# Patient Record
Sex: FEMALE | Race: WHITE | Employment: OTHER | URBAN - METROPOLITAN AREA
[De-identification: names, ages, dates, MRNs, and addresses within clinical notes are randomized per-mention and may not be internally consistent; named-entity substitution may affect disease eponyms.]

---

## 2024-06-10 ENCOUNTER — HOSPITAL ENCOUNTER (INPATIENT)
Age: 69
LOS: 3 days | Discharge: NURSING, REHAB OR LONG-TERM CARE | End: 2024-06-14
Attending: EMERGENCY MEDICINE | Admitting: STUDENT IN AN ORGANIZED HEALTH CARE EDUCATION/TRAINING PROGRAM
Payer: COMMERCIAL

## 2024-06-10 DIAGNOSIS — R79.89 ELEVATED TROPONIN: ICD-10-CM

## 2024-06-10 DIAGNOSIS — J96.01 ACUTE RESPIRATORY FAILURE WITH HYPOXIA (HCC): Primary | ICD-10-CM

## 2024-06-10 DIAGNOSIS — N39.0 SEPSIS SECONDARY TO UTI (HCC): ICD-10-CM

## 2024-06-10 DIAGNOSIS — G89.4 CHRONIC PAIN SYNDROME: ICD-10-CM

## 2024-06-10 DIAGNOSIS — A41.9 SEPSIS SECONDARY TO UTI (HCC): ICD-10-CM

## 2024-06-10 PROCEDURE — 82803 BLOOD GASES ANY COMBINATION: CPT

## 2024-06-10 PROCEDURE — 85025 COMPLETE CBC W/AUTO DIFF WBC: CPT

## 2024-06-10 PROCEDURE — 2700000000 HC OXYGEN THERAPY PER DAY

## 2024-06-10 PROCEDURE — 83880 ASSAY OF NATRIURETIC PEPTIDE: CPT

## 2024-06-10 PROCEDURE — 80053 COMPREHEN METABOLIC PANEL: CPT

## 2024-06-10 PROCEDURE — 99285 EMERGENCY DEPT VISIT HI MDM: CPT

## 2024-06-10 PROCEDURE — 87636 SARSCOV2 & INF A&B AMP PRB: CPT

## 2024-06-10 PROCEDURE — 84484 ASSAY OF TROPONIN QUANT: CPT

## 2024-06-10 PROCEDURE — 83690 ASSAY OF LIPASE: CPT

## 2024-06-10 PROCEDURE — 94761 N-INVAS EAR/PLS OXIMETRY MLT: CPT

## 2024-06-10 PROCEDURE — 83605 ASSAY OF LACTIC ACID: CPT

## 2024-06-10 RX ORDER — FLUDROCORTISONE ACETATE 0.1 MG/1
100 TABLET ORAL NIGHTLY
COMMUNITY
Start: 2021-08-25

## 2024-06-10 RX ORDER — CIPROFLOXACIN 500 MG/1
500 TABLET, FILM COATED ORAL 2 TIMES DAILY
Status: ON HOLD | COMMUNITY
End: 2024-06-14 | Stop reason: HOSPADM

## 2024-06-10 RX ORDER — NYSTATIN 100000 U/G
OINTMENT TOPICAL 2 TIMES DAILY
Status: ON HOLD | COMMUNITY
Start: 2024-02-12 | End: 2024-06-14 | Stop reason: HOSPADM

## 2024-06-10 RX ORDER — FUROSEMIDE 20 MG/1
20 TABLET ORAL DAILY PRN
COMMUNITY
Start: 2024-02-12

## 2024-06-10 RX ORDER — LEVETIRACETAM 250 MG/1
15 TABLET, FILM COATED ORAL EVERY 6 HOURS PRN
Status: ON HOLD | COMMUNITY
Start: 2024-02-12 | End: 2024-06-14 | Stop reason: HOSPADM

## 2024-06-10 RX ORDER — NIFEDIPINE 30 MG
30 TABLET, EXTENDED RELEASE ORAL DAILY
COMMUNITY
Start: 2024-02-13

## 2024-06-10 RX ORDER — QUETIAPINE FUMARATE 100 MG/1
100 TABLET, FILM COATED ORAL DAILY
COMMUNITY
Start: 2022-06-08

## 2024-06-10 RX ORDER — KETOCONAZOLE 20 MG/G
CREAM TOPICAL
Status: ON HOLD | COMMUNITY
Start: 2024-03-20 | End: 2024-06-14 | Stop reason: HOSPADM

## 2024-06-10 RX ORDER — LORATADINE 10 MG/1
10 TABLET ORAL DAILY
COMMUNITY
Start: 2021-08-25

## 2024-06-10 RX ORDER — BUSPIRONE HYDROCHLORIDE 5 MG/1
TABLET ORAL
Status: ON HOLD | COMMUNITY
Start: 2024-03-04 | End: 2024-06-14 | Stop reason: HOSPADM

## 2024-06-10 RX ORDER — MECLIZINE HCL 25MG 25 MG/1
12.5 TABLET, CHEWABLE ORAL DAILY PRN
Status: ON HOLD | COMMUNITY
End: 2024-06-14 | Stop reason: HOSPADM

## 2024-06-10 RX ORDER — DIVALPROEX SODIUM 500 MG/1
500 TABLET, EXTENDED RELEASE ORAL 2 TIMES DAILY
COMMUNITY
Start: 2024-05-01

## 2024-06-10 RX ORDER — CALCITRIOL 0.25 UG/1
0.25 CAPSULE, LIQUID FILLED ORAL
Status: ON HOLD | COMMUNITY
Start: 2024-01-08 | End: 2024-06-14 | Stop reason: HOSPADM

## 2024-06-10 RX ORDER — CLONAZEPAM 0.5 MG/1
0.5 TABLET ORAL 3 TIMES DAILY PRN
COMMUNITY
Start: 2024-06-05

## 2024-06-10 RX ORDER — FOLIC ACID 1 MG/1
1 TABLET ORAL DAILY
COMMUNITY
Start: 2023-08-30

## 2024-06-10 RX ORDER — MECLIZINE HCL 12.5 MG/1
12.5 TABLET ORAL 3 TIMES DAILY PRN
Status: ON HOLD | COMMUNITY
Start: 2021-08-25 | End: 2024-06-14 | Stop reason: HOSPADM

## 2024-06-10 RX ORDER — FAMOTIDINE 20 MG/1
20 TABLET, FILM COATED ORAL 2 TIMES DAILY PRN
COMMUNITY
Start: 2021-08-25

## 2024-06-10 RX ORDER — DIVALPROEX SODIUM 500 MG/1
500 TABLET, DELAYED RELEASE ORAL EVERY 12 HOURS
Status: ON HOLD | COMMUNITY
Start: 2024-02-12 | End: 2024-06-14 | Stop reason: HOSPADM

## 2024-06-10 RX ORDER — QUETIAPINE FUMARATE 200 MG/1
200 TABLET, FILM COATED ORAL NIGHTLY
COMMUNITY
Start: 2024-02-12

## 2024-06-10 ASSESSMENT — PAIN - FUNCTIONAL ASSESSMENT: PAIN_FUNCTIONAL_ASSESSMENT: 0-10

## 2024-06-10 ASSESSMENT — PAIN SCALES - GENERAL: PAINLEVEL_OUTOF10: 8

## 2024-06-10 ASSESSMENT — LIFESTYLE VARIABLES
HOW OFTEN DO YOU HAVE A DRINK CONTAINING ALCOHOL: NEVER
HOW MANY STANDARD DRINKS CONTAINING ALCOHOL DO YOU HAVE ON A TYPICAL DAY: PATIENT DOES NOT DRINK

## 2024-06-11 ENCOUNTER — APPOINTMENT (OUTPATIENT)
Dept: CT IMAGING | Age: 69
End: 2024-06-11
Payer: COMMERCIAL

## 2024-06-11 ENCOUNTER — APPOINTMENT (OUTPATIENT)
Dept: GENERAL RADIOLOGY | Age: 69
End: 2024-06-11
Payer: COMMERCIAL

## 2024-06-11 PROBLEM — D64.9 ANEMIA: Status: ACTIVE | Noted: 2024-06-11

## 2024-06-11 PROBLEM — A41.9 SEPSIS (HCC): Status: ACTIVE | Noted: 2024-06-11

## 2024-06-11 PROBLEM — N17.9 ACUTE RENAL FAILURE SUPERIMPOSED ON STAGE 3 CHRONIC KIDNEY DISEASE (HCC): Status: ACTIVE | Noted: 2024-06-11

## 2024-06-11 PROBLEM — E66.01 MORBID OBESITY (HCC): Status: ACTIVE | Noted: 2024-06-11

## 2024-06-11 PROBLEM — R09.02 HYPOXIA: Status: ACTIVE | Noted: 2024-06-11

## 2024-06-11 PROBLEM — N39.0 UTI (URINARY TRACT INFECTION): Status: ACTIVE | Noted: 2024-06-11

## 2024-06-11 PROBLEM — I95.1 ORTHOSTATIC HYPOTENSION: Status: ACTIVE | Noted: 2024-06-11

## 2024-06-11 PROBLEM — N18.30 ACUTE RENAL FAILURE SUPERIMPOSED ON STAGE 3 CHRONIC KIDNEY DISEASE (HCC): Status: ACTIVE | Noted: 2024-06-11

## 2024-06-11 LAB
ALBUMIN SERPL-MCNC: 3.8 G/DL (ref 3.4–5)
ALBUMIN/GLOB SERPL: 1 {RATIO} (ref 1.1–2.2)
ALP SERPL-CCNC: 74 U/L (ref 40–129)
ALT SERPL-CCNC: 12 U/L (ref 10–40)
ANION GAP SERPL CALCULATED.3IONS-SCNC: 13 MMOL/L (ref 3–16)
ANION GAP SERPL CALCULATED.3IONS-SCNC: 9 MMOL/L (ref 3–16)
AST SERPL-CCNC: 15 U/L (ref 15–37)
BACTERIA URNS QL MICRO: ABNORMAL /HPF
BASE EXCESS BLDV CALC-SCNC: 0 MMOL/L (ref -3–3)
BASOPHILS # BLD: 0 K/UL (ref 0–0.2)
BASOPHILS # BLD: 0.1 K/UL (ref 0–0.2)
BASOPHILS NFR BLD: 0.2 %
BASOPHILS NFR BLD: 0.6 %
BILIRUB SERPL-MCNC: 0.3 MG/DL (ref 0–1)
BILIRUB UR QL STRIP.AUTO: NEGATIVE
BUN SERPL-MCNC: 26 MG/DL (ref 7–20)
BUN SERPL-MCNC: 26 MG/DL (ref 7–20)
CALCIUM SERPL-MCNC: 8.2 MG/DL (ref 8.3–10.6)
CALCIUM SERPL-MCNC: 8.8 MG/DL (ref 8.3–10.6)
CHLORIDE SERPL-SCNC: 94 MMOL/L (ref 99–110)
CHLORIDE SERPL-SCNC: 99 MMOL/L (ref 99–110)
CLARITY UR: ABNORMAL
CO2 BLDV-SCNC: 26 MMOL/L
CO2 SERPL-SCNC: 26 MMOL/L (ref 21–32)
CO2 SERPL-SCNC: 27 MMOL/L (ref 21–32)
COHGB MFR BLDV: 3.6 % (ref 0–1.5)
COLOR UR: ABNORMAL
CREAT SERPL-MCNC: 1.7 MG/DL (ref 0.6–1.2)
CREAT SERPL-MCNC: 1.8 MG/DL (ref 0.6–1.2)
DEPRECATED RDW RBC AUTO: 17.1 % (ref 12.4–15.4)
DEPRECATED RDW RBC AUTO: 17.2 % (ref 12.4–15.4)
EKG ATRIAL RATE: 113 BPM
EKG DIAGNOSIS: NORMAL
EKG P AXIS: 85 DEGREES
EKG P-R INTERVAL: 154 MS
EKG Q-T INTERVAL: 342 MS
EKG QRS DURATION: 76 MS
EKG QTC CALCULATION (BAZETT): 469 MS
EKG R AXIS: 10 DEGREES
EKG T AXIS: 46 DEGREES
EKG VENTRICULAR RATE: 113 BPM
EOSINOPHIL # BLD: 0 K/UL (ref 0–0.6)
EOSINOPHIL # BLD: 0.1 K/UL (ref 0–0.6)
EOSINOPHIL NFR BLD: 0.3 %
EOSINOPHIL NFR BLD: 0.3 %
FLUAV RNA RESP QL NAA+PROBE: NOT DETECTED
FLUBV RNA RESP QL NAA+PROBE: NOT DETECTED
GFR SERPLBLD CREATININE-BSD FMLA CKD-EPI: 30 ML/MIN/{1.73_M2}
GFR SERPLBLD CREATININE-BSD FMLA CKD-EPI: 32 ML/MIN/{1.73_M2}
GLUCOSE SERPL-MCNC: 130 MG/DL (ref 70–99)
GLUCOSE SERPL-MCNC: 134 MG/DL (ref 70–99)
GLUCOSE UR STRIP.AUTO-MCNC: NEGATIVE MG/DL
HCO3 BLDV-SCNC: 24.7 MMOL/L (ref 23–29)
HCT VFR BLD AUTO: 31.4 % (ref 36–48)
HCT VFR BLD AUTO: 35.6 % (ref 36–48)
HGB BLD-MCNC: 11.3 G/DL (ref 12–16)
HGB BLD-MCNC: 9.9 G/DL (ref 12–16)
HGB UR QL STRIP.AUTO: ABNORMAL
KETONES UR STRIP.AUTO-MCNC: NEGATIVE MG/DL
LACTATE BLDV-SCNC: 1.5 MMOL/L (ref 0.4–1.9)
LEUKOCYTE ESTERASE UR QL STRIP.AUTO: ABNORMAL
LIPASE SERPL-CCNC: 11 U/L (ref 13–60)
LYMPHOCYTES # BLD: 2 K/UL (ref 1–5.1)
LYMPHOCYTES # BLD: 2.3 K/UL (ref 1–5.1)
LYMPHOCYTES NFR BLD: 12.3 %
LYMPHOCYTES NFR BLD: 13 %
MCH RBC QN AUTO: 29.9 PG (ref 26–34)
MCH RBC QN AUTO: 30 PG (ref 26–34)
MCHC RBC AUTO-ENTMCNC: 31.5 G/DL (ref 31–36)
MCHC RBC AUTO-ENTMCNC: 31.9 G/DL (ref 31–36)
MCV RBC AUTO: 94.1 FL (ref 80–100)
MCV RBC AUTO: 95.1 FL (ref 80–100)
METHGB MFR BLDV: 0.3 %
MONOCYTES # BLD: 1.3 K/UL (ref 0–1.3)
MONOCYTES # BLD: 1.7 K/UL (ref 0–1.3)
MONOCYTES NFR BLD: 8.2 %
MONOCYTES NFR BLD: 9.1 %
NEUTROPHILS # BLD: 12.1 K/UL (ref 1.7–7.7)
NEUTROPHILS # BLD: 14.3 K/UL (ref 1.7–7.7)
NEUTROPHILS NFR BLD: 77.7 %
NEUTROPHILS NFR BLD: 78.3 %
NITRITE UR QL STRIP.AUTO: POSITIVE
NT-PROBNP SERPL-MCNC: 388 PG/ML (ref 0–124)
O2 THERAPY: ABNORMAL
PCO2 BLDV: 40.5 MMHG (ref 40–50)
PH BLDV: 7.4 [PH] (ref 7.35–7.45)
PH UR STRIP.AUTO: 6 [PH] (ref 5–8)
PLATELET # BLD AUTO: 231 K/UL (ref 135–450)
PLATELET # BLD AUTO: 252 K/UL (ref 135–450)
PMV BLD AUTO: 7.5 FL (ref 5–10.5)
PMV BLD AUTO: 7.9 FL (ref 5–10.5)
PO2 BLDV: 37.7 MMHG (ref 25–40)
POTASSIUM SERPL-SCNC: 4.2 MMOL/L (ref 3.5–5.1)
POTASSIUM SERPL-SCNC: 4.9 MMOL/L (ref 3.5–5.1)
PROT SERPL-MCNC: 7.6 G/DL (ref 6.4–8.2)
PROT UR STRIP.AUTO-MCNC: 30 MG/DL
RBC # BLD AUTO: 3.3 M/UL (ref 4–5.2)
RBC # BLD AUTO: 3.78 M/UL (ref 4–5.2)
RBC #/AREA URNS HPF: ABNORMAL /HPF (ref 0–4)
SAO2 % BLDV: 72 %
SARS-COV-2 RNA RESP QL NAA+PROBE: NOT DETECTED
SODIUM SERPL-SCNC: 134 MMOL/L (ref 136–145)
SODIUM SERPL-SCNC: 134 MMOL/L (ref 136–145)
SP GR UR STRIP.AUTO: 1.02 (ref 1–1.03)
TROPONIN, HIGH SENSITIVITY: 53 NG/L (ref 0–14)
TROPONIN, HIGH SENSITIVITY: 54 NG/L (ref 0–14)
UA COMPLETE W REFLEX CULTURE PNL UR: YES
UA DIPSTICK W REFLEX MICRO PNL UR: YES
URN SPEC COLLECT METH UR: ABNORMAL
UROBILINOGEN UR STRIP-ACNC: 0.2 E.U./DL
WBC # BLD AUTO: 15.5 K/UL (ref 4–11)
WBC # BLD AUTO: 18.4 K/UL (ref 4–11)
WBC #/AREA URNS HPF: ABNORMAL /HPF (ref 0–5)

## 2024-06-11 PROCEDURE — 94761 N-INVAS EAR/PLS OXIMETRY MLT: CPT

## 2024-06-11 PROCEDURE — 87040 BLOOD CULTURE FOR BACTERIA: CPT

## 2024-06-11 PROCEDURE — 71250 CT THORAX DX C-: CPT

## 2024-06-11 PROCEDURE — 80048 BASIC METABOLIC PNL TOTAL CA: CPT

## 2024-06-11 PROCEDURE — 97535 SELF CARE MNGMENT TRAINING: CPT

## 2024-06-11 PROCEDURE — 74176 CT ABD & PELVIS W/O CONTRAST: CPT

## 2024-06-11 PROCEDURE — 97167 OT EVAL HIGH COMPLEX 60 MIN: CPT

## 2024-06-11 PROCEDURE — 97530 THERAPEUTIC ACTIVITIES: CPT

## 2024-06-11 PROCEDURE — 6360000002 HC RX W HCPCS: Performed by: PHYSICIAN ASSISTANT

## 2024-06-11 PROCEDURE — 6370000000 HC RX 637 (ALT 250 FOR IP): Performed by: STUDENT IN AN ORGANIZED HEALTH CARE EDUCATION/TRAINING PROGRAM

## 2024-06-11 PROCEDURE — 93010 ELECTROCARDIOGRAM REPORT: CPT | Performed by: INTERNAL MEDICINE

## 2024-06-11 PROCEDURE — 87088 URINE BACTERIA CULTURE: CPT

## 2024-06-11 PROCEDURE — 85025 COMPLETE CBC W/AUTO DIFF WBC: CPT

## 2024-06-11 PROCEDURE — 2060000000 HC ICU INTERMEDIATE R&B

## 2024-06-11 PROCEDURE — 6370000000 HC RX 637 (ALT 250 FOR IP): Performed by: NURSE PRACTITIONER

## 2024-06-11 PROCEDURE — 1200000000 HC SEMI PRIVATE

## 2024-06-11 PROCEDURE — 84484 ASSAY OF TROPONIN QUANT: CPT

## 2024-06-11 PROCEDURE — 71045 X-RAY EXAM CHEST 1 VIEW: CPT

## 2024-06-11 PROCEDURE — 2700000000 HC OXYGEN THERAPY PER DAY

## 2024-06-11 PROCEDURE — 87186 SC STD MICRODIL/AGAR DIL: CPT

## 2024-06-11 PROCEDURE — 96365 THER/PROPH/DIAG IV INF INIT: CPT

## 2024-06-11 PROCEDURE — 6370000000 HC RX 637 (ALT 250 FOR IP): Performed by: PHYSICIAN ASSISTANT

## 2024-06-11 PROCEDURE — 87086 URINE CULTURE/COLONY COUNT: CPT

## 2024-06-11 PROCEDURE — 2580000003 HC RX 258: Performed by: PHYSICIAN ASSISTANT

## 2024-06-11 PROCEDURE — 96367 TX/PROPH/DG ADDL SEQ IV INF: CPT

## 2024-06-11 PROCEDURE — 97162 PT EVAL MOD COMPLEX 30 MIN: CPT

## 2024-06-11 PROCEDURE — 81001 URINALYSIS AUTO W/SCOPE: CPT

## 2024-06-11 PROCEDURE — 94640 AIRWAY INHALATION TREATMENT: CPT

## 2024-06-11 PROCEDURE — 2580000003 HC RX 258: Performed by: STUDENT IN AN ORGANIZED HEALTH CARE EDUCATION/TRAINING PROGRAM

## 2024-06-11 PROCEDURE — 93005 ELECTROCARDIOGRAM TRACING: CPT | Performed by: PHYSICIAN ASSISTANT

## 2024-06-11 PROCEDURE — 6360000002 HC RX W HCPCS: Performed by: STUDENT IN AN ORGANIZED HEALTH CARE EDUCATION/TRAINING PROGRAM

## 2024-06-11 RX ORDER — MECLIZINE HCL 12.5 MG/1
12.5 TABLET ORAL 3 TIMES DAILY PRN
Status: DISCONTINUED | OUTPATIENT
Start: 2024-06-11 | End: 2024-06-12 | Stop reason: ALTCHOICE

## 2024-06-11 RX ORDER — CLONAZEPAM 0.5 MG/1
0.5 TABLET ORAL 3 TIMES DAILY PRN
Status: DISCONTINUED | OUTPATIENT
Start: 2024-06-11 | End: 2024-06-14 | Stop reason: HOSPADM

## 2024-06-11 RX ORDER — DULOXETIN HYDROCHLORIDE 60 MG/1
60 CAPSULE, DELAYED RELEASE ORAL DAILY
Status: DISCONTINUED | OUTPATIENT
Start: 2024-06-11 | End: 2024-06-11

## 2024-06-11 RX ORDER — TRAZODONE HYDROCHLORIDE 50 MG/1
200 TABLET ORAL NIGHTLY
Status: DISCONTINUED | OUTPATIENT
Start: 2024-06-11 | End: 2024-06-11

## 2024-06-11 RX ORDER — POLYETHYLENE GLYCOL 3350 17 G/17G
17 POWDER, FOR SOLUTION ORAL DAILY PRN
Status: DISCONTINUED | OUTPATIENT
Start: 2024-06-11 | End: 2024-06-14 | Stop reason: HOSPADM

## 2024-06-11 RX ORDER — SODIUM CHLORIDE 0.9 % (FLUSH) 0.9 %
5-40 SYRINGE (ML) INJECTION EVERY 12 HOURS SCHEDULED
Status: DISCONTINUED | OUTPATIENT
Start: 2024-06-11 | End: 2024-06-14 | Stop reason: HOSPADM

## 2024-06-11 RX ORDER — HYDROCORTISONE 10 MG/1
10 TABLET ORAL DAILY
Status: DISCONTINUED | OUTPATIENT
Start: 2024-06-11 | End: 2024-06-11

## 2024-06-11 RX ORDER — 0.9 % SODIUM CHLORIDE 0.9 %
1000 INTRAVENOUS SOLUTION INTRAVENOUS ONCE
Status: COMPLETED | OUTPATIENT
Start: 2024-06-11 | End: 2024-06-11

## 2024-06-11 RX ORDER — ACETAMINOPHEN 325 MG/1
650 TABLET ORAL ONCE
Status: COMPLETED | OUTPATIENT
Start: 2024-06-11 | End: 2024-06-11

## 2024-06-11 RX ORDER — FERROUS SULFATE 325(65) MG
325 TABLET ORAL
COMMUNITY

## 2024-06-11 RX ORDER — SODIUM CHLORIDE 9 MG/ML
INJECTION, SOLUTION INTRAVENOUS PRN
Status: DISCONTINUED | OUTPATIENT
Start: 2024-06-11 | End: 2024-06-14 | Stop reason: SDUPTHER

## 2024-06-11 RX ORDER — TEMAZEPAM 7.5 MG/1
15 CAPSULE ORAL NIGHTLY PRN
Status: DISCONTINUED | OUTPATIENT
Start: 2024-06-11 | End: 2024-06-12 | Stop reason: ALTCHOICE

## 2024-06-11 RX ORDER — CALCIUM POLYCARBOPHIL 625 MG 625 MG/1
625 TABLET ORAL 2 TIMES DAILY
COMMUNITY

## 2024-06-11 RX ORDER — SODIUM CHLORIDE 9 MG/ML
INJECTION, SOLUTION INTRAVENOUS CONTINUOUS
Status: DISCONTINUED | OUTPATIENT
Start: 2024-06-11 | End: 2024-06-12

## 2024-06-11 RX ORDER — NIFEDIPINE 30 MG/1
30 TABLET, EXTENDED RELEASE ORAL DAILY
Status: DISCONTINUED | OUTPATIENT
Start: 2024-06-11 | End: 2024-06-14 | Stop reason: HOSPADM

## 2024-06-11 RX ORDER — KETOTIFEN FUMARATE 0.35 MG/ML
1 SOLUTION/ DROPS OPHTHALMIC 2 TIMES DAILY
COMMUNITY

## 2024-06-11 RX ORDER — SODIUM CHLORIDE 0.9 % (FLUSH) 0.9 %
5-40 SYRINGE (ML) INJECTION PRN
Status: DISCONTINUED | OUTPATIENT
Start: 2024-06-11 | End: 2024-06-14 | Stop reason: HOSPADM

## 2024-06-11 RX ORDER — FLUDROCORTISONE ACETATE 0.1 MG/1
100 TABLET ORAL DAILY
Status: DISCONTINUED | OUTPATIENT
Start: 2024-06-11 | End: 2024-06-14 | Stop reason: HOSPADM

## 2024-06-11 RX ORDER — 0.9 % SODIUM CHLORIDE 0.9 %
2000 INTRAVENOUS SOLUTION INTRAVENOUS ONCE
Status: COMPLETED | OUTPATIENT
Start: 2024-06-11 | End: 2024-06-11

## 2024-06-11 RX ORDER — FOLIC ACID 1 MG/1
1 TABLET ORAL DAILY
Status: DISCONTINUED | OUTPATIENT
Start: 2024-06-11 | End: 2024-06-14 | Stop reason: HOSPADM

## 2024-06-11 RX ORDER — ENOXAPARIN SODIUM 100 MG/ML
30 INJECTION SUBCUTANEOUS 2 TIMES DAILY
Status: DISCONTINUED | OUTPATIENT
Start: 2024-06-11 | End: 2024-06-14 | Stop reason: HOSPADM

## 2024-06-11 RX ORDER — DIVALPROEX SODIUM 250 MG/1
500 TABLET, DELAYED RELEASE ORAL EVERY 12 HOURS
Status: DISCONTINUED | OUTPATIENT
Start: 2024-06-11 | End: 2024-06-11

## 2024-06-11 RX ORDER — POTASSIUM CHLORIDE 20 MEQ/1
40 TABLET, EXTENDED RELEASE ORAL PRN
Status: DISCONTINUED | OUTPATIENT
Start: 2024-06-11 | End: 2024-06-14 | Stop reason: HOSPADM

## 2024-06-11 RX ORDER — BUSPIRONE HYDROCHLORIDE 5 MG/1
5 TABLET ORAL 3 TIMES DAILY
Status: DISCONTINUED | OUTPATIENT
Start: 2024-06-11 | End: 2024-06-11

## 2024-06-11 RX ORDER — HYDROCORTISONE 10 MG/1
10 TABLET ORAL NIGHTLY
Status: DISCONTINUED | OUTPATIENT
Start: 2024-06-11 | End: 2024-06-14 | Stop reason: HOSPADM

## 2024-06-11 RX ORDER — ACETAMINOPHEN 325 MG/1
650 TABLET ORAL EVERY 6 HOURS PRN
Status: DISCONTINUED | OUTPATIENT
Start: 2024-06-11 | End: 2024-06-14 | Stop reason: HOSPADM

## 2024-06-11 RX ORDER — POTASSIUM CHLORIDE 7.45 MG/ML
10 INJECTION INTRAVENOUS PRN
Status: DISCONTINUED | OUTPATIENT
Start: 2024-06-11 | End: 2024-06-14 | Stop reason: HOSPADM

## 2024-06-11 RX ORDER — FAMOTIDINE 20 MG/1
20 TABLET, FILM COATED ORAL DAILY
Status: DISCONTINUED | OUTPATIENT
Start: 2024-06-11 | End: 2024-06-14 | Stop reason: HOSPADM

## 2024-06-11 RX ORDER — LEVOTHYROXINE SODIUM 0.15 MG/1
150 TABLET ORAL
Status: DISCONTINUED | OUTPATIENT
Start: 2024-06-12 | End: 2024-06-14 | Stop reason: HOSPADM

## 2024-06-11 RX ORDER — HYDROCODONE BITARTRATE AND ACETAMINOPHEN 7.5; 325 MG/1; MG/1
1 TABLET ORAL 2 TIMES DAILY
Status: ON HOLD | COMMUNITY
End: 2024-06-14 | Stop reason: HOSPADM

## 2024-06-11 RX ORDER — MAGNESIUM SULFATE IN WATER 40 MG/ML
2000 INJECTION, SOLUTION INTRAVENOUS PRN
Status: DISCONTINUED | OUTPATIENT
Start: 2024-06-11 | End: 2024-06-14 | Stop reason: HOSPADM

## 2024-06-11 RX ORDER — HYDROCODONE BITARTRATE AND ACETAMINOPHEN 5; 325 MG/1; MG/1
2 TABLET ORAL EVERY 6 HOURS PRN
Status: DISCONTINUED | OUTPATIENT
Start: 2024-06-11 | End: 2024-06-14 | Stop reason: HOSPADM

## 2024-06-11 RX ORDER — HYDROCODONE BITARTRATE AND ACETAMINOPHEN 10; 325 MG/1; MG/1
1 TABLET ORAL 2 TIMES DAILY
Status: ON HOLD | COMMUNITY
End: 2024-06-14 | Stop reason: HOSPADM

## 2024-06-11 RX ORDER — TRAMADOL HYDROCHLORIDE 50 MG/1
50 TABLET ORAL EVERY 6 HOURS PRN
Status: DISCONTINUED | OUTPATIENT
Start: 2024-06-11 | End: 2024-06-14 | Stop reason: HOSPADM

## 2024-06-11 RX ORDER — CALCITRIOL 0.25 UG/1
0.25 CAPSULE, LIQUID FILLED ORAL DAILY
Status: DISCONTINUED | OUTPATIENT
Start: 2024-06-11 | End: 2024-06-11

## 2024-06-11 RX ORDER — TRAZODONE HYDROCHLORIDE 50 MG/1
25 TABLET ORAL NIGHTLY
Status: DISCONTINUED | OUTPATIENT
Start: 2024-06-11 | End: 2024-06-14 | Stop reason: HOSPADM

## 2024-06-11 RX ORDER — LEVOTHYROXINE SODIUM 0.12 MG/1
125 TABLET ORAL
Status: DISCONTINUED | OUTPATIENT
Start: 2024-06-11 | End: 2024-06-11

## 2024-06-11 RX ORDER — GABAPENTIN 300 MG/1
300 CAPSULE ORAL 2 TIMES DAILY
Status: DISCONTINUED | OUTPATIENT
Start: 2024-06-11 | End: 2024-06-14 | Stop reason: HOSPADM

## 2024-06-11 RX ORDER — ACETAMINOPHEN 650 MG/1
650 SUPPOSITORY RECTAL EVERY 6 HOURS PRN
Status: DISCONTINUED | OUTPATIENT
Start: 2024-06-11 | End: 2024-06-14 | Stop reason: HOSPADM

## 2024-06-11 RX ORDER — MAGNESIUM HYDROXIDE/ALUMINUM HYDROXICE/SIMETHICONE 120; 1200; 1200 MG/30ML; MG/30ML; MG/30ML
15 SUSPENSION ORAL EVERY 6 HOURS PRN
Status: DISCONTINUED | OUTPATIENT
Start: 2024-06-11 | End: 2024-06-14 | Stop reason: HOSPADM

## 2024-06-11 RX ORDER — MONTELUKAST SODIUM 10 MG/1
10 TABLET ORAL NIGHTLY
Status: DISCONTINUED | OUTPATIENT
Start: 2024-06-11 | End: 2024-06-14 | Stop reason: HOSPADM

## 2024-06-11 RX ORDER — CALCIUM CARBONATE 500 MG/1
2 TABLET, CHEWABLE ORAL EVERY 6 HOURS PRN
COMMUNITY

## 2024-06-11 RX ORDER — QUETIAPINE FUMARATE 100 MG/1
200 TABLET, FILM COATED ORAL NIGHTLY
Status: DISCONTINUED | OUTPATIENT
Start: 2024-06-11 | End: 2024-06-14 | Stop reason: HOSPADM

## 2024-06-11 RX ORDER — GABAPENTIN 300 MG/1
300 CAPSULE ORAL 3 TIMES DAILY
Status: DISCONTINUED | OUTPATIENT
Start: 2024-06-11 | End: 2024-06-11

## 2024-06-11 RX ORDER — HYDROCORTISONE 20 MG/1
20 TABLET ORAL DAILY
Status: ON HOLD | COMMUNITY
End: 2024-06-14 | Stop reason: HOSPADM

## 2024-06-11 RX ADMIN — FLUDROCORTISONE ACETATE 100 MCG: 0.1 TABLET ORAL at 08:35

## 2024-06-11 RX ADMIN — Medication 2 PUFF: at 09:21

## 2024-06-11 RX ADMIN — SODIUM CHLORIDE 1000 ML: 9 INJECTION, SOLUTION INTRAVENOUS at 00:35

## 2024-06-11 RX ADMIN — CEFEPIME 2000 MG: 2 INJECTION, POWDER, FOR SOLUTION INTRAVENOUS at 01:12

## 2024-06-11 RX ADMIN — TRAZODONE HYDROCHLORIDE 25 MG: 50 TABLET ORAL at 21:18

## 2024-06-11 RX ADMIN — SODIUM CHLORIDE 1000 ML: 9 INJECTION, SOLUTION INTRAVENOUS at 04:53

## 2024-06-11 RX ADMIN — HYDROCODONE BITARTRATE AND ACETAMINOPHEN 2 TABLET: 5; 325 TABLET ORAL at 17:23

## 2024-06-11 RX ADMIN — BUSPIRONE HYDROCHLORIDE 5 MG: 5 TABLET ORAL at 08:35

## 2024-06-11 RX ADMIN — CLONAZEPAM 0.5 MG: 0.5 TABLET ORAL at 15:47

## 2024-06-11 RX ADMIN — METOPROLOL TARTRATE 25 MG: 25 TABLET, FILM COATED ORAL at 21:19

## 2024-06-11 RX ADMIN — GABAPENTIN 300 MG: 300 CAPSULE ORAL at 08:35

## 2024-06-11 RX ADMIN — HYDROCODONE BITARTRATE AND ACETAMINOPHEN 2 TABLET: 5; 325 TABLET ORAL at 11:43

## 2024-06-11 RX ADMIN — QUETIAPINE FUMARATE 200 MG: 100 TABLET ORAL at 21:21

## 2024-06-11 RX ADMIN — DIVALPROEX SODIUM 500 MG: 250 TABLET, DELAYED RELEASE ORAL at 08:34

## 2024-06-11 RX ADMIN — HYDROCODONE BITARTRATE AND ACETAMINOPHEN 2 TABLET: 5; 325 TABLET ORAL at 04:47

## 2024-06-11 RX ADMIN — HYDROCORTISONE 10 MG: 10 TABLET ORAL at 21:19

## 2024-06-11 RX ADMIN — GABAPENTIN 300 MG: 300 CAPSULE ORAL at 21:19

## 2024-06-11 RX ADMIN — CEFEPIME 2000 MG: 2 INJECTION, POWDER, FOR SOLUTION INTRAVENOUS at 14:33

## 2024-06-11 RX ADMIN — SODIUM CHLORIDE: 9 INJECTION, SOLUTION INTRAVENOUS at 14:25

## 2024-06-11 RX ADMIN — FOLIC ACID 1 MG: 1 TABLET ORAL at 08:35

## 2024-06-11 RX ADMIN — FAMOTIDINE 20 MG: 20 TABLET, FILM COATED ORAL at 08:35

## 2024-06-11 RX ADMIN — VANCOMYCIN HYDROCHLORIDE 2000 MG: 1 INJECTION, POWDER, LYOPHILIZED, FOR SOLUTION INTRAVENOUS at 01:57

## 2024-06-11 RX ADMIN — MONTELUKAST 10 MG: 10 TABLET, FILM COATED ORAL at 21:18

## 2024-06-11 RX ADMIN — HYDROCORTISONE 10 MG: 10 TABLET ORAL at 08:35

## 2024-06-11 RX ADMIN — SODIUM CHLORIDE: 9 INJECTION, SOLUTION INTRAVENOUS at 23:46

## 2024-06-11 RX ADMIN — DULOXETINE HYDROCHLORIDE 60 MG: 60 CAPSULE, DELAYED RELEASE ORAL at 08:34

## 2024-06-11 RX ADMIN — ENOXAPARIN SODIUM 30 MG: 100 INJECTION SUBCUTANEOUS at 08:35

## 2024-06-11 RX ADMIN — ACETAMINOPHEN 650 MG: 325 TABLET ORAL at 00:35

## 2024-06-11 RX ADMIN — ACETAMINOPHEN 650 MG: 325 TABLET ORAL at 20:01

## 2024-06-11 RX ADMIN — ENOXAPARIN SODIUM 30 MG: 100 INJECTION SUBCUTANEOUS at 21:18

## 2024-06-11 RX ADMIN — Medication 2 PUFF: at 19:24

## 2024-06-11 ASSESSMENT — ENCOUNTER SYMPTOMS
ALLERGIC/IMMUNOLOGIC NEGATIVE: 1
EYES NEGATIVE: 1
SHORTNESS OF BREATH: 1
ABDOMINAL PAIN: 1

## 2024-06-11 ASSESSMENT — PAIN SCALES - GENERAL
PAINLEVEL_OUTOF10: 7
PAINLEVEL_OUTOF10: 8
PAINLEVEL_OUTOF10: 8
PAINLEVEL_OUTOF10: 5
PAINLEVEL_OUTOF10: 8
PAINLEVEL_OUTOF10: 6
PAINLEVEL_OUTOF10: 8
PAINLEVEL_OUTOF10: 4
PAINLEVEL_OUTOF10: 10
PAINLEVEL_OUTOF10: 8

## 2024-06-11 ASSESSMENT — PAIN DESCRIPTION - ORIENTATION
ORIENTATION: POSTERIOR
ORIENTATION: LOWER

## 2024-06-11 ASSESSMENT — PAIN SCALES - WONG BAKER
WONGBAKER_NUMERICALRESPONSE: HURTS EVEN MORE
WONGBAKER_NUMERICALRESPONSE: NO HURT
WONGBAKER_NUMERICALRESPONSE: HURTS LITTLE MORE
WONGBAKER_NUMERICALRESPONSE: NO HURT

## 2024-06-11 ASSESSMENT — PAIN DESCRIPTION - DESCRIPTORS
DESCRIPTORS: ACHING;SORE;TENDER
DESCRIPTORS: ACHING;SHARP;THROBBING
DESCRIPTORS: SQUEEZING;THROBBING

## 2024-06-11 ASSESSMENT — PAIN DESCRIPTION - LOCATION
LOCATION: HEAD
LOCATION: HEAD
LOCATION: ABDOMEN
LOCATION: HEAD
LOCATION: BACK
LOCATION: HEAD

## 2024-06-11 NOTE — PLAN OF CARE
Problem: Discharge Planning  Goal: Discharge to home or other facility with appropriate resources  6/11/2024 1010 by Elizabeth Ayala RN  Outcome: Progressing     Problem: Pain  Goal: Verbalizes/displays adequate comfort level or baseline comfort level  6/11/2024 1010 by Elizabeth Ayala RN  Outcome: Progressing     Problem: Safety - Adult  Goal: Free from fall injury  6/11/2024 1010 by Elizabeth Ayala RN  Outcome: Progressing     Problem: Skin/Tissue Integrity  Goal: Absence of new skin breakdown  Description: 1.  Monitor for areas of redness and/or skin breakdown  2.  Assess vascular access sites hourly  3.  Every 4-6 hours minimum:  Change oxygen saturation probe site  4.  Every 4-6 hours:  If on nasal continuous positive airway pressure, respiratory therapy assess nares and determine need for appliance change or resting period.  6/11/2024 1010 by Elizabeth Ayala, RN  Outcome: Progressing

## 2024-06-11 NOTE — ED NOTES
Pt presented to the ED with stool on herself. Pt stated ileostomy busted and stool went everywhere prior to EMS arriving to pick her up. Pt was cleaned up and gown and linens were replaced. External purewick also placed on pt.

## 2024-06-11 NOTE — ED PROVIDER NOTES
[facility] A2 CARD TELEMETRY     EMERGENCY DEPARTMENT ENCOUNTER     Location: [facility] A2 CARD TELEMETRY  6/10/2024  Note Started: 5:03 AM EDT 6/11/24      Patient Identification  Jil Mcneill is a 68 y.o. female      HPI:Jil Mcneill was evaluated in the Emergency Department for nausea, headache, fever. Although initial history and physical exam information was obtained by TIARA/NPP/MD/DO (who also dictated a record of this visit), I personally saw the patient and performed and made/approved the management plan and take responsibility for the patient management.      PHYSICAL EXAM:  Nontoxic-appearing adult female in no acute distress, heart and lung sounds are normal, abdomen is nontender    EKG Interpretation  Sinus tachycardia rate of 113 bpm, MI interval QRS QTc normal per normal axis no acute ischemic changes.  No prior for comparison.  This twelve-lead EKG was read and interpreted by myself.    Patient seen and evaluated.  Relevant records reviewed.  MDM: Adult female who comes in with fever, headache and nausea, she is found to have a urinary tract infection.  Patient also hypoxic here despite no complaints of chest pain or shortness of breath.  Diagnostic workup reveals an elevated troponin x 2 with no acute ischemic changes on EKG.  She has leukocytosis, significant leukocytes in the urine, she has an KIMBERLI possibly and hyponatremia and hypochloremia.    Patient was placed on supplemental oxygen via nasal cannula.  CT chest abdomen pelvis with without contrast was ordered.  We did consider a CT PE and a CT of the abdomen pelvis with IV contrast however given her renal function this was not ordered.  CT chest does show findings concerning for infection.  The patient is presently on antibiotics that was ordered here for urinary tract infection.    She will require admission to the hospital for further care.  A consult is placed to the hospitalist on duty and the case is discussed between the TIARA and the hospitalist

## 2024-06-11 NOTE — PLAN OF CARE
Problem: Discharge Planning  Goal: Discharge to home or other facility with appropriate resources  6/11/2024 1915 by Nohemy Jernigan, RN  Outcome: Progressing     Problem: Pain  Goal: Verbalizes/displays adequate comfort level or baseline comfort level  6/11/2024 1915 by Nohemy Jernigan, RN  Outcome: Progressing     Problem: Safety - Adult  Goal: Free from fall injury  6/11/2024 1915 by Nohemy Jernigan, RN  Outcome: Progressing     Problem: Skin/Tissue Integrity  Goal: Absence of new skin breakdown  Description: 1.  Monitor for areas of redness and/or skin breakdown  2.  Assess vascular access sites hourly  3.  Every 4-6 hours minimum:  Change oxygen saturation probe site  4.  Every 4-6 hours:  If on nasal continuous positive airway pressure, respiratory therapy assess nares and determine need for appliance change or resting period.  6/11/2024 1915 by Nohemy Jernigan, RN  Outcome: Progressing

## 2024-06-11 NOTE — PROGRESS NOTES
[]Xarelto  []Coumadin  []Other -      Code status: Full Code    PT/OT Eval Status:   []NOT yet ordered  []Ordered and Pending   [x]Seen with Recommendations for:  [x] LTC with PT independently  []Home w/ assist  []HHC  []SNF  []Acute Rehab    Anticipated Discharge Day/Date:  2+ days    Anticipated Discharge Location: []Home  []HHC  []SNF  []Acute Rehab  [x]ECF  []LTAC  []Hospice  []Other -      Consults:      PHARMACY TO DOSE VANCOMYCIN  PHARMACY TO DOSE VANCOMYCIN      This patient has a high likelihood of being discharged tomorrow and is appropriate for A1 Discharge Unit in AM pending clinical course overnight: []Yes  [x]No    ------------------------------------------------------------------------------------------------------------------------------------------------------------------------    [facility]  (this section is simply meant as an aid to accurate billing and does not necessarily reflect ongoing patient care which is documented elsewhere in the medical record)    [x] High (any 2)    A. Problems (any 1)  [x] Acute/Chronic Illness/injury posing threat to life or bodily function:  sepsis  [] Severe exacerbation of chronic illness:    ---------------------------------------------------------------------  B. Risk of Treatment (any 1)   [x] Drugs/treatments that require intensive monitoring for toxicity include:    [x] IV ABX requiring serial renal monitoring for nephrotoxicity:   vanco  [] Post-Cath/Contrast study requiring serial renal monitoring for Contrast Induced Nephropathy  [] IV Narcotic analgesia for ADR   [] Aggressive IV diuresis requiring serial monitoring for renal impairment and electrolyte derangements  [] Hypertonic Saline requiring serial renal monitoring for appropriate electrolyte correction rate   [] Critical electrolyte abnormalities requiring IV replacement and close serial monitoring  [] Insulin - monitoring FSBS for Hypoglycemic ADR  [] Anticoagulation requiring close serial monitoring and  dose adjustments at high risk of ADR   [] HD requiring close serial monitoring of electrolytes and fluid status  [] Other -  [x] Change in code status:    [] Decision to escalate care:    [] Major surgery/procedure with associated risk factors:    ----------------------------------------------------------------------  C. Data (any 2)  [] Discussed management of the case with consultants as follows:    [x] Discussed the discharge plan in detail with case mgt including timing/barriers to discharge, need for support services and placement decision   [] Imaging personally reviewed and interpreted, includes:   [x] Telemetry monitoring as noted above  [x] Data Review (any 3)  [x] Collateral history obtained from:  pt, review of emr  [x] All available Consultant notes from yesterday/today were reviewed  [x] All current labs were reviewed and interpreted for clinical significance   [x] Appropriate follow-up labs were ordered    Medications:  Personally reviewed in detail in conjunction w/ labs as documented for evidence of drug toxicity.     Infusion Medications    sodium chloride      sodium chloride 100 mL/hr at 06/11/24 0456     Scheduled Medications    mometasone-formoterol  2 puff Inhalation BID    busPIRone  5 mg Oral TID    calcitRIOL  0.25 mcg Oral Daily    divalproex  500 mg Oral Q12H    DULoxetine  60 mg Oral Daily    famotidine  20 mg Oral Daily    fludrocortisone  100 mcg Oral Daily    folic acid  1 mg Oral Daily    gabapentin  300 mg Oral TID    hydrocortisone  10 mg Oral Daily    levothyroxine  125 mcg Oral QAM AC    metoprolol tartrate  25 mg Oral BID    montelukast  10 mg Oral Nightly    NIFEdipine  30 mg Oral Daily    QUEtiapine  200 mg Oral Nightly    traZODone  200 mg Oral Nightly    sodium chloride flush  5-40 mL IntraVENous 2 times per day    enoxaparin  30 mg SubCUTAneous BID    cefepime  2,000 mg IntraVENous Q12H    [START ON 6/12/2024] vancomycin  750 mg IntraVENous Q24H     PRN Meds: aluminum &

## 2024-06-11 NOTE — H&P
Hospitalist History and Physical      PCP: Ceasar Araujo MD    Date of Admission: 6/10/2024     Anticipated Discharge Day/Date - 6/12/24    Anticipated Discharge Location:  []Home  []HHC  [x]SNF  []Acute Rehab  []Hospice    Assessment/Plan:      Sepsis (HCC)    Sepsis. Hypoxia requiring 5L. . CT chest suggestive of scattered pna. Leuk without bands. S/p iv vanc/cefepime in the ED. Due to Innumerous drug allergies, continue the same. Presence of UTI. Antibiotics similarly. Follow blood and urine cultures. RT protocol.  KIMBERLI on WCD4M-O. Scr 1.8 from baseline 1.3-1.4. No obstruction on CT. Suspect dehydration. IVF  Orthostatic hypotension? Cont florinef, cortef. She is also on BB, adalat. Is rx'd lasix prn. Normal EF 55 on echo 2/2024 [facility].  Anemia hgb 11.3 normocytic. No overt bleed. Has crohns s/p ostomy with likely component of malabsorption  Mood disorder. Cont rx'd meds  Morbid obesity Body mass index is 44.3 kg/m².    Other chronic medical conditions reviewed and managed.    DVT Prophylaxis:  [x]PPx LMWH  []SQ Heparin  []IPC/SCDs  []Eliquis  []Xarelto  []Coumadin  []Other -        Diet: ADULT DIET; Regular  Code Status: Full Code      78 Minutes were spent solely for medical decision making for this patient including documentation, ordering and interpreting labs imaging and studies, independently reviewing the chart as well as discussing plan of care with the family patient ancillary staff and coordinating their care.    Chief Complaint, History of Present Illness, Review of Systems       Chief Complaint   malaise    History of Present Illness   Jil Mcneill is a 68 y.o. female with a history of CKD3, COPD, anemia, orthostatic hypotension, mood disorder, morbid obesity, innumerous drug allergies who presented tonight at NH after feeling sudden onset of fevers and chills and genearlized weakness. Denies dysuria or cough in the preceding days and not aware of ill contacts about her. Does endorse

## 2024-06-11 NOTE — CONSULTS
Pharmacy to Dose Vancomycin in ED    Dx: Sepsis  Goal trough = 15-20  Pt wt = 136.1 kg  Recent Labs     06/10/24  2358   CREATININE 1.8*     Recent Labs     06/10/24  2358   WBC 18.4*     Vancomycin 2000 mg x1  Reynaldo Faust [facility] 6/11/2024 1:30 AM

## 2024-06-11 NOTE — ED PROVIDER NOTES
Opening: Spontaneous  Best Verbal Response: Oriented  Best Motor Response: Obeys commands  [place] Coma Scale Score: 15                CIWA Assessment  BP: (!) 108/52  Pulse: 90           PHYSICAL EXAM  1 or more Elements     ED Triage Vitals [06/10/24 2342]   BP Temp Temp Source Pulse Respirations SpO2 Height Weight - Scale   136/66 98.1 °F (36.7 °C) Oral (!) 116 16 93 % 1.753 m (5' 9\") 136.1 kg (300 lb)       Physical Exam  Vitals and nursing note reviewed.   Constitutional:       Appearance: Normal appearance. She is not diaphoretic.   HENT:      Head: Normocephalic and atraumatic.      Nose: Nose normal.      Mouth/Throat:      Mouth: Mucous membranes are moist.   Eyes:      General:         Right eye: No discharge.         Left eye: No discharge.      Extraocular Movements: Extraocular movements intact.      Pupils: Pupils are equal, round, and reactive to light.   Cardiovascular:      Rate and Rhythm: Regular rhythm. Tachycardia present.      Pulses: Normal pulses.      Heart sounds: Normal heart sounds. No murmur heard.     No friction rub. No gallop.   Pulmonary:      Effort: Pulmonary effort is normal. No respiratory distress.      Breath sounds: No stridor. Wheezing present. No rhonchi or rales.   Abdominal:      General: Abdomen is flat.      Palpations: Abdomen is soft.      Tenderness: There is no abdominal tenderness. There is no guarding or rebound.      Comments: Ileostomy bag in place.  She has surrounding erythema and irritation.  She has some generalized tenderness on exam of abdomen.   Musculoskeletal:         General: Normal range of motion.      Cervical back: Normal range of motion and neck supple.   Skin:     General: Skin is warm and dry.      Coloration: Skin is not pale.   Neurological:      Mental Status: She is alert and oriented to person, place, and time.   Psychiatric:         Mood and Affect: Mood normal.         Behavior: Behavior normal.             DIAGNOSTIC RESULTS  results are displayed. All other labs were within normal range or not returned as of this dictation.    EKG: When ordered, EKG's are interpreted by the Emergency Department Physician in the absence of a cardiologist.  Please see their note for interpretation of EKG.    RADIOLOGY:   Non-plain film images such as CT, Ultrasound and MRI are read by the radiologist. Plain radiographic images are visualized and preliminarily interpreted by the ED Provider with the below findings:    Chest x-ray shows borderline cardiomegaly with mild minimal central pulmonary congestion or pulmonary artery hypertension.  CT chest abdomen pelvis is pending.    Interpretation per the Radiologist below, if available at the time of this note:    CT CHEST ABDOMEN PELVIS WO CONTRAST Additional Contrast? None   Final Result   Few scattered subtle nodular opacities in the lungs bilaterally with left   basilar infiltrate concerning for an infectious etiology.      No acute abdominal or pelvic abnormality.         XR CHEST PORTABLE   Final Result   Borderline cardiomegaly and minimal central pulmonary congestion or pulmonary   artery hypertension      Mild bibasilar atelectasis or early infiltrates           XR CHEST PORTABLE    Result Date: 6/11/2024  EXAMINATION: ONE XRAY VIEW OF THE CHEST 6/11/2024 12:02 am COMPARISON: None HISTORY: ORDERING SYSTEM PROVIDED HISTORY: Chest Pain TECHNOLOGIST PROVIDED HISTORY: Reason for exam:->Chest Pain Reason for exam:->SOB Reason for Exam: n/v ha FINDINGS: The heart is borderline enlarged.  The pulmonary vessels are slightly engorged centrally.  The lungs are hypoinflated.  There are hazy bibasilar linear and ground-glass opacities.  No effusion is seen.     Borderline cardiomegaly and minimal central pulmonary congestion or pulmonary artery hypertension Mild bibasilar atelectasis or early infiltrates       No results found.    PROCEDURES   Unless otherwise noted below, none     Procedures    CRITICAL CARE

## 2024-06-11 NOTE — PROGRESS NOTES
Patient admitted to room 203 from ER.  Patient oriented to room, call light, bed rails, phone, lights and bathroom.  Patient instructed about the schedule of the day including: vital sign frequency, lab draws, possible tests, frequency of MD and staff rounds, including RN/MD rounding together at bedside, daily weights, and I &O's.  Patient instructed about prescribed diet, how to use MENU, and television. bed alarm in place, patient aware of placement and reason. Telemetry box in place, patient aware of placement and reason.  Bed locked, in lowest position, side rails up 2/4, call light within reach.  Will continue to monitor.

## 2024-06-11 NOTE — PROGRESS NOTES
Pharmacy Medication History Note      List of current medications patient is taking is complete.   Prior to Admission medications    Medication Sig Start Date End Date Taking? Authorizing Provider   aluminum & magnesium hydroxide-simethicone (ALMACONE DOUBLE STRENGTH) 400-400-40 MG/5ML SUSP Take 15 mLs by mouth every 6 hours as needed 2/12/24  Yes ProviderLeela MD   busPIRone (BUSPAR) 5 MG tablet  3/4/24  Yes Provider, MD Leela   calcitRIOL (ROCALTROL) 0.25 MCG capsule Take 1 capsule by mouth 1/8/24  Yes Provider, MD Leela   divalproex (DEPAKOTE) 500 MG DR tablet Take 1 tablet by mouth in the morning and 1 tablet in the evening. 2/12/24  Yes ProviderLeela MD   divalproex (DEPAKOTE ER) 500 MG extended release tablet  5/1/24  Yes Provider, MD Leela   famotidine (PEPCID) 20 MG tablet Take 1 tablet by mouth 2 times daily as needed 8/25/21  Yes Provider, MD Leela   fludrocortisone (FLORINEF) 0.1 MG tablet Take 1 tablet by mouth daily 8/25/21  Yes Provider, Historical, MD   folic acid (FOLVITE) 1 MG tablet Take 1 tablet by mouth daily 8/30/23  Yes Provider, MD Leela   furosemide (LASIX) 20 MG tablet Take 1 tablet by mouth daily as needed 2/12/24  Yes Provider, MD Leela   ketoconazole (NIZORAL) 2 % cream  3/20/24  Yes Provider, MD Leela   loratadine (CLARITIN) 10 MG tablet Take 1 tablet by mouth daily 8/25/21  Yes Provider, MD Leela   meclizine (ANTIVERT) 12.5 MG tablet Take 1 tablet by mouth 3 times daily as needed 8/25/21  Yes Provider, Historical, MD   metoprolol tartrate (LOPRESSOR) 25 MG tablet Take 1 tablet by mouth 2 times daily 2/15/24  Yes Provider, MD Leela   miconazole (MICOTIN) 2 % powder Apply topically 2 times daily 9/25/23  Yes Provider, MD Leela   NIFEdipine (ADALAT CC) 30 MG extended release tablet Take 1 tablet by mouth daily 2/13/24  Yes Provider, MD Leela   nystatin (MYCOSTATIN) 860541 UNIT/GM ointment Apply topically 2

## 2024-06-11 NOTE — PROGRESS NOTES
4 Eyes Skin Assessment     The patient is being assess for  Admission    I agree that 2 RN's have performed a thorough Head to Toe Skin Assessment on the patient. ALL assessment sites listed below have been assessed.       Areas assessed by both nurses: Franca Slater  []   Head, Face, and Ears   []   Shoulders, Back, and Chest  []   Arms, Elbows, and Hands   []   Coccyx, Sacrum, and Ischum  []   Legs, Feet, and Heels        Does the Patient have Skin Breakdown?  Yes a wound was noted on the Admission Assessment and an WOUND LDA was Initiated documentation include the Dayan-wound, Wound Assessment, Measurements, Dressing Treatment, Drainage, and Color\",         Jose Prevention initiated:  Yes   Wound Care Orders initiated:  Yes      WOC nurse consulted for Pressure Injury (Stage 3,4, Unstageable, DTI, NWPT, and Complex wounds):  Yes      Nurse 1 eSignature: Electronically signed by MELISA CANTU RN on 6/11/24 at 4:43 AM EDT    **SHARE this note so that the co-signing nurse is able to place an eSignature**    Nurse 2 eSignature: Electronically signed by Franca Lorenzo RN on 6/11/24 at 5:35 AM EDT

## 2024-06-11 NOTE — PROGRESS NOTES
Call placed to emergency contact (Jorge) per pt request. No answer, voicemail left to call floor ([phone]) when able regarding aunt's admission.      Addendum 0608: Nephew given update, available for any questions/concerns r/t pt. He is hopeful to visit later today.

## 2024-06-11 NOTE — CONSULTS
Consult Placed     Who: [facility]- Nohemy Saleh  Date: 6/11/2024  Time: 1245     Electronically signed by Elizabeth Ayala RN on 6/11/2024 at 12:46 PM

## 2024-06-11 NOTE — CONSULTS
Ostomy Referral Follow-up Progress Note      NAME:  Jil Mcneill  MEDICAL RECORD NUMBER:  [id number]  AGE: 68 y.o.   GENDER:  female  :  1955  TODAY'S DATE:  2024    Subjective: I have tried so many things to get a good seal.  Nothing really works that well.    Jil Mcneill is a 68 y.o. female referred by:   [] Physician  [x] Nursing  [] Other:     Existing ileostomy: Hx of rectosigmoid resection and end ileostomy for Crohns disease 2009.       Stoma size:  28 mm. Red, protrudes, junction intact.  Appliance size:  Covetec Convex one piece esteem body appliance.  Crust with stoma powder then barrier spray.  One paste ring around stoma. One paste strip 1/2 each) in crease toward naval.  Barrier extenders around stoma pouch.      Objective Sitting up in chair.  Current pouch leaking. Just finished eating.    /63   Pulse 100   Temp 98.2 °F (36.8 °C) (Oral)   Resp 20   Ht 1.753 m (5' 9\")   Wt 136.1 kg (300 lb)   SpO2 96%   BMI 44.30 kg/m²     Jose Risk Score Jose Scale Score: 16    Assessment: stoma red, protrudes.  Stool loose brown.  Dayan stomal skin denuded, red, painful.     Surgeon Unknown at this time    Ileostomy       Ileostomy/Jejunostomy LLQ Ileostomy (Active)   Stomal Appliance 1 piece;Convex;Changed 24 1410   Flange Size (inches) 1.75 Inches 24 1410   Stoma  Assessment Moist;Protrudes;Red 24 141   Peristomal Assessment Denuded;Burgundy;Painful;Red 24 1410   Mucocutaneous Junction Intact 24 1410   Treatment Site care;Stoma powder;Liquid skin barrier;Barrier ring;Stoma paste;Heat applied;Ostomy belt 24 1410   Stool Appearance Loose 24 1410   Stool Color Brown 24 1410   Stool Amount Small 24 1410   Output (mL) 100 ml 24 1410   Number of days: 5618         Intake/Output Summary (Last 24 hours) at 2024 1412  Last data filed at 2024 1410  Gross per 24 hour   Intake 290 ml   Output 175 ml   Net 115 ml       Plan:  able to:  [] Indicates understanding       [] Needs reinforcement  [] Unsuccessful      [x] Verbal Understanding  [x] Demonstrated understanding       [] No evidence of learning  [] Refused teaching         [] N/A    Electronically signed by Tisha Anderson, RN, MSN, CCRN, CWOCN on 6/11/2024 at 2:12 PM

## 2024-06-11 NOTE — CONSULTS
[facility] Consult Note           Reason for Consult: KIMBERLI on CKD  Requesting Physician:  Dr. Jeronimo    Chief Complaint: Nausea vomiting  History Obtained From:  Electronic records, patient    History of Present Ilness:    68-year-old female past medical history of COPD, Crohn's disease, orthostatic hypotension, CKD stage III follows with Dr. Fuentes who presented to the ER on 6/10/2024 with complaints of worsening nausea, vomiting and headaches.  She was also noted to be hypoxic and requiring oxygen with a fever of 103.  Patient was noted to have possible pneumonia versus UTI and was admitted for sepsis.  Also noted to have acute kidney injury with a creatinine of 1.8 from a baseline of 1.3-1.4  Nephrology is consulted for the acute kidney injury  Patient is just complaining of feeling unwell. She says she doesn't wear oxygen at home and has some sob requiring nasal canula now.   Denies vomiting this morning.  Has some urgency in urination    Problem: KIMBERLI  Location: Kidney   Severity: Cr 1.8  Onset: days- weeks  Associated problems: abnormal labs  Aggravating factors: Sepsis      Patient denies  NSAIDs, herbal medictions, Anabolic steroids , IV contrast , PPI usage       Past Medical History:   Diagnosis Date    Anemia     Chronic pain     pain mgmt and psych    COPD (chronic obstructive pulmonary disease) (HCC)     Crohn's disease (HCC)     Depression     Morbid obesity (HCC)     Orthostatic hypotension     Renal insufficiency          Past Surgical History:   Procedure Laterality Date    HIP FRACTURE SURGERY      ILEOSTOMY OR JEJUNOSTOMY         Home Medications:  No current facility-administered medications on file prior to encounter.     Current Outpatient Medications on File Prior to Encounter   Medication Sig Dispense Refill    calcium carbonate (TUMS) 500 MG chewable tablet Take 2 tablets by mouth every 6 hours as needed for Heartburn      polycarbophil

## 2024-06-11 NOTE — CONSULTS
Pharmacy to Dose Vancomycin    Dx: Sepsis  Goal AUC = 400-600  Pt wt = 136.1 kg  Recent Labs     06/10/24  2358   CREATININE 1.8*     Recent Labs     06/10/24  2358   WBC 18.4*     Regimen: 750 mg IV every 24 hours.  Start time: 06:00 on 06/12/2024  Exposure target: AUC24 (range)400-600 mg/L.hr   OGH32-57: 525 mg/L.hr  AUC24,ss: 452 mg/L.hr  Probability of AUC24 > 400: 60 %  Ctrough,ss: 13.7 mg/L  Probability of Ctrough,ss > 20: 29 %  Vancomycin trough: 6/13 0500  Reynaldo Faust [facility] 6/11/2024 4:55 AM      Vancomycin Day 3    Recent Labs     06/13/24  0455   VANCOTROUGH 14.2     No results for input(s): \"VANCORANDOM\" in the last 72 hours.  Recent Labs     06/11/24  0451 06/12/24  0500 06/13/24  0455   CREATININE 1.7* 2.1* 1.6*     Estimated Creatinine Clearance: 49 mL/min (A) (based on SCr of 1.6 mg/dL (H)).  Recent Labs     06/11/24  0451 06/12/24  0500 06/13/24  0455   WBC 15.5* 8.4 7.6     Plan: continue vancomycin 750mg q24h  Predicted AUC: 421 mg/L.hr  Predicted trough: 12.3 mg/L  Recheck vancomycin random level 6/15/24 0600  Derek Diaz [facility] 6/13/2024 6:49 AM

## 2024-06-11 NOTE — ED NOTES
Pt requiring oxygen nasal cannula per arrival. States she does not wear oxygen at baseline. Pts oxygen sats in the 70s on room air. Pt placed on 6L NC with oxygen saturation of 97%

## 2024-06-11 NOTE — CARE COORDINATION
Case Management Assessment  Initial Evaluation    Date/Time of Evaluation: 6/11/2024 1:53 PM  Assessment Completed by: Nati Salazar RN    If patient is discharged prior to next notation, then this note serves as note for discharge by case management.    Patient Name: Jil Mcneill                   YOB: 1955  Diagnosis: Elevated troponin [R79.89]  Sepsis secondary to UTI (HCC) [A41.9, N39.0]  Acute respiratory failure with hypoxia (HCC) [J96.01]  Sepsis (HCC) [A41.9]                   Date / Time: 6/10/2024 11:26 PM    Patient Admission Status: Inpatient   Readmission Risk (Low < 19, Mod (19-27), High > 27): Readmission Risk Score: 17.6    Current PCP: Ceasar Araujo MD  PCP verified by CM? Yes    Chart Reviewed: Yes      History Provided by: Other (see comment) (caregiver at [facility], patient getting wound care at this time)  Patient Orientation: Alert and Oriented    Patient Cognition: Alert    Hospitalization in the last 30 days (Readmission):  No    If yes, Readmission Assessment in  Navigator will be completed.    Advance Directives:      Code Status: DNR-CC   Patient's Primary Decision Maker is: Legal Next of Kin      Discharge Planning:    Patient lives with: Alone Type of Home: Long-Term Care  Primary Care Giver: Self  Patient Support Systems include: Family Members   Current Financial resources: Medicare, Medicaid  Current community resources: ECF/Home Care  Current services prior to admission: Durable Medical Equipment, Extended Care Facility            Current DME:              Type of Home Care services:  None    ADLS  Prior functional level:    Current functional level:      PT AM-PAC: 8 /24  OT AM-PAC:   /24    Family can provide assistance at DC: Yes  Would you like Case Management to discuss the discharge plan with any other family members/significant others, and if so, who? Yes  Plans to Return to Present Housing: Yes  Potential Assistance needed at discharge: Skilled Nursing

## 2024-06-11 NOTE — ED NOTES
Ms. Mcneill is a 68 y.o. female who had concerns including Nausea, Emesis, and Headache.    Chief Complaint   Patient presents with    Nausea    Emesis    Headache       She is being admitted for:    Sepsis ([facility])    Her ED problem list included:    1. Acute respiratory failure with hypoxia ([facility])    2. Sepsis secondary to UTI ([facility])    3. Elevated troponin        Past Medical History:   Diagnosis Date    Anemia     Chronic pain     pain mgmt and psych    COPD (chronic obstructive pulmonary disease) (HCC)     Crohn's disease (HCC)     Depression     Morbid obesity ([facility])     Orthostatic hypotension     Renal insufficiency        Past Surgical History:   Procedure Laterality Date    HIP FRACTURE SURGERY      ILEOSTOMY OR JEJUNOSTOMY         Her recent abnormal labs were:    Labs Reviewed   CBC WITH AUTO DIFFERENTIAL - Abnormal; Notable for the following components:       Result Value    WBC 18.4 (*)     RBC 3.78 (*)     Hemoglobin 11.3 (*)     Hematocrit 35.6 (*)     RDW 17.1 (*)     Neutrophils Absolute 14.3 (*)     Monocytes Absolute 1.7 (*)     All other components within normal limits   COMPREHENSIVE METABOLIC PANEL W/ REFLEX TO MG FOR LOW K - Abnormal; Notable for the following components:    Sodium 134 (*)     Chloride 94 (*)     Glucose 130 (*)     BUN 26 (*)     Creatinine 1.8 (*)     Est, Glom Filt Rate 30 (*)     Albumin/Globulin Ratio 1.0 (*)     All other components within normal limits   LIPASE - Abnormal; Notable for the following components:    Lipase 11.0 (*)     All other components within normal limits   TROPONIN - Abnormal; Notable for the following components:    Troponin, High Sensitivity 54 (*)     All other components within normal limits   TROPONIN - Abnormal; Notable for the following components:    Troponin, High Sensitivity 53 (*)     All other components within normal limits   BRAIN NATRIURETIC PEPTIDE - Abnormal; Notable for the following components:    Pro- (*)     All other components

## 2024-06-11 NOTE — CONSULTS
Consult Placed     Who: [facility]  Date: 6/11/2024  Time: 0948     Electronically signed by Elizabeth Ayala RN on 6/11/2024 at 9:53 AM

## 2024-06-11 NOTE — PROGRESS NOTES
Physical Therapy  Facility/Department: [facility] A2 CARD TELEMETRY  Physical Therapy Initial Assessment    Name: Jil Mcneill  : 1955  MRN: [id number]  Date of Service: 2024    Discharge Recommendations:  Long Term Care with PT   PT Equipment Recommendations  Equipment Needed: No  Other: pt owns needed DME      Patient Diagnosis(es): The primary encounter diagnosis was Acute respiratory failure with hypoxia ([facility]). Diagnoses of Sepsis secondary to UTI ([facility]) and Elevated troponin were also pertinent to this visit.  Past Medical History:  has a past medical history of Anemia, Chronic pain, COPD (chronic obstructive pulmonary disease) ([facility]), Crohn's disease ([facility]), Depression, Morbid obesity ([facility]), Orthostatic hypotension, and Renal insufficiency.  Past Surgical History:  has a past surgical history that includes Jejunostomy and Hip fracture surgery.    Assessment   Body Structures, Functions, Activity Limitations Requiring Skilled Therapeutic Intervention: Decreased functional mobility   Assessment: pt is 67 yo female admit to [facility] 2/2 sepsis, UTI, nausea, emesis, HA; PMH: COPD, ARF; pt is transitioning to live at the [facility], pt requires assist for mobility and bathing but is able to transfer independently squat-pivot, pt is functioning below baseline and presents requiring Max A of 2 with the Mellissa Hylton to transfer to the chair from the bed, pt is limited by body habitus and pain, pt will benefit from Acute Inpatient Skilled PT to address functional mobility deficits, PT recommends LTC with PT at [place]  Treatment Diagnosis: decreased functional mobility  Therapy Prognosis: Good  Decision Making: Medium Complexity  Requires PT Follow-Up: Yes  Activity Tolerance  Activity Tolerance: Patient tolerated evaluation without incident     Plan   Physical Therapy Plan  General Plan: 2-3 times per week  Current Treatment Recommendations: Strengthening, ROM, Balance training, Functional mobility training,  Training: No (pt is non-ambulatory at baseline)              Balance  Sitting - Static: Good  Sitting - Dynamic: Fair  Standing - Static: Fair (in stedy with B UE support)             AM-PAC - Mobility    AM-PAC Basic Mobility - Inpatient   How much help is needed turning from your back to your side while in a flat bed without using bedrails?: A Lot  How much help is needed moving from lying on your back to sitting on the side of a flat bed without using bedrails?: A Lot  How much help is needed moving to and from a bed to a chair?: Total  How much help is needed standing up from a chair using your arms?: Total  How much help is needed walking in hospital room?: Total  How much help is needed climbing 3-5 steps with a railing?: Total  AM-PAC Inpatient Mobility Raw Score : 8  AM-PAC Inpatient T-Scale Score : 28.52  Mobility Inpatient CMS 0-100% Score: 86.62  Mobility Inpatient CMS G-Code Modifier : CM           Goals  Short Term Goals  Time Frame for Short Term Goals: one week 6/18/24 unless otherwise indicated  Short Term Goal 1: pt will transfer supine to and from sit with supervision by 6/15  Short Term Goal 2: pt will transfer bed to and from chair with Min A  Short Term Goal 3: pt will demonstrate and tolerate 10 reps of B LE therex  Patient Goals   Patient Goals : to go back to [place]       Education  Patient Education  Education Given To: Patient  Education Provided: Role of Therapy;Plan of Care;Transfer Training;Equipment;Fall Prevention Strategies  Education Provided Comments: Disease Specific Education: Pt educated on importance of OOB mobility, prevention of complications of bedrest, and general safety during hospitalization.  Education Method: Verbal;Demonstration  Barriers to Learning: None  Education Outcome: Verbalized understanding;Demonstrated understanding;Continued education needed      Therapy Time   Individual Concurrent Group Co-treatment   Time In 0930         Time Out 1004         Minutes 34

## 2024-06-12 ENCOUNTER — APPOINTMENT (OUTPATIENT)
Dept: GENERAL RADIOLOGY | Age: 69
End: 2024-06-12
Payer: COMMERCIAL

## 2024-06-12 LAB
ANION GAP SERPL CALCULATED.3IONS-SCNC: 10 MMOL/L (ref 3–16)
BACTERIA UR CULT: ABNORMAL
BASOPHILS # BLD: 0 K/UL (ref 0–0.2)
BASOPHILS NFR BLD: 0.2 %
BUN SERPL-MCNC: 29 MG/DL (ref 7–20)
C3 SERPL-MCNC: 152.8 MG/DL (ref 90–180)
C4 SERPL-MCNC: 44.2 MG/DL (ref 10–40)
CALCIUM SERPL-MCNC: 7.7 MG/DL (ref 8.3–10.6)
CHLORIDE SERPL-SCNC: 101 MMOL/L (ref 99–110)
CO2 SERPL-SCNC: 27 MMOL/L (ref 21–32)
CREAT SERPL-MCNC: 2.1 MG/DL (ref 0.6–1.2)
DEPRECATED RDW RBC AUTO: 16.9 % (ref 12.4–15.4)
EOSINOPHIL # BLD: 0.3 K/UL (ref 0–0.6)
EOSINOPHIL NFR BLD: 3.2 %
GFR SERPLBLD CREATININE-BSD FMLA CKD-EPI: 25 ML/MIN/{1.73_M2}
GLUCOSE SERPL-MCNC: 109 MG/DL (ref 70–99)
HCT VFR BLD AUTO: 28.3 % (ref 36–48)
HGB BLD-MCNC: 8.9 G/DL (ref 12–16)
LYMPHOCYTES # BLD: 1.3 K/UL (ref 1–5.1)
LYMPHOCYTES NFR BLD: 15.5 %
MCH RBC QN AUTO: 30.2 PG (ref 26–34)
MCHC RBC AUTO-ENTMCNC: 31.5 G/DL (ref 31–36)
MCV RBC AUTO: 95.7 FL (ref 80–100)
MONOCYTES # BLD: 0.7 K/UL (ref 0–1.3)
MONOCYTES NFR BLD: 7.9 %
NEUTROPHILS # BLD: 6.1 K/UL (ref 1.7–7.7)
NEUTROPHILS NFR BLD: 73.2 %
NT-PROBNP SERPL-MCNC: 443 PG/ML (ref 0–124)
ORGANISM: ABNORMAL
PLATELET # BLD AUTO: 192 K/UL (ref 135–450)
PMV BLD AUTO: 7.2 FL (ref 5–10.5)
POTASSIUM SERPL-SCNC: 4.2 MMOL/L (ref 3.5–5.1)
RBC # BLD AUTO: 2.95 M/UL (ref 4–5.2)
SODIUM SERPL-SCNC: 138 MMOL/L (ref 136–145)
WBC # BLD AUTO: 8.4 K/UL (ref 4–11)

## 2024-06-12 PROCEDURE — 97530 THERAPEUTIC ACTIVITIES: CPT

## 2024-06-12 PROCEDURE — 86160 COMPLEMENT ANTIGEN: CPT

## 2024-06-12 PROCEDURE — 94761 N-INVAS EAR/PLS OXIMETRY MLT: CPT

## 2024-06-12 PROCEDURE — 36415 COLL VENOUS BLD VENIPUNCTURE: CPT

## 2024-06-12 PROCEDURE — 6370000000 HC RX 637 (ALT 250 FOR IP): Performed by: NURSE PRACTITIONER

## 2024-06-12 PROCEDURE — 84165 PROTEIN E-PHORESIS SERUM: CPT

## 2024-06-12 PROCEDURE — 86038 ANTINUCLEAR ANTIBODIES: CPT

## 2024-06-12 PROCEDURE — 51798 US URINE CAPACITY MEASURE: CPT

## 2024-06-12 PROCEDURE — 85025 COMPLETE CBC W/AUTO DIFF WBC: CPT

## 2024-06-12 PROCEDURE — 83521 IG LIGHT CHAINS FREE EACH: CPT

## 2024-06-12 PROCEDURE — 80048 BASIC METABOLIC PNL TOTAL CA: CPT

## 2024-06-12 PROCEDURE — 84155 ASSAY OF PROTEIN SERUM: CPT

## 2024-06-12 PROCEDURE — 6360000002 HC RX W HCPCS: Performed by: NURSE PRACTITIONER

## 2024-06-12 PROCEDURE — 71045 X-RAY EXAM CHEST 1 VIEW: CPT

## 2024-06-12 PROCEDURE — 2700000000 HC OXYGEN THERAPY PER DAY

## 2024-06-12 PROCEDURE — 83880 ASSAY OF NATRIURETIC PEPTIDE: CPT

## 2024-06-12 PROCEDURE — 6360000002 HC RX W HCPCS: Performed by: STUDENT IN AN ORGANIZED HEALTH CARE EDUCATION/TRAINING PROGRAM

## 2024-06-12 PROCEDURE — 2580000003 HC RX 258: Performed by: STUDENT IN AN ORGANIZED HEALTH CARE EDUCATION/TRAINING PROGRAM

## 2024-06-12 PROCEDURE — 1200000000 HC SEMI PRIVATE

## 2024-06-12 PROCEDURE — 2580000003 HC RX 258: Performed by: NURSE PRACTITIONER

## 2024-06-12 PROCEDURE — 6370000000 HC RX 637 (ALT 250 FOR IP): Performed by: STUDENT IN AN ORGANIZED HEALTH CARE EDUCATION/TRAINING PROGRAM

## 2024-06-12 PROCEDURE — 87641 MR-STAPH DNA AMP PROBE: CPT

## 2024-06-12 PROCEDURE — 94640 AIRWAY INHALATION TREATMENT: CPT

## 2024-06-12 RX ORDER — SODIUM CHLORIDE 9 MG/ML
INJECTION, SOLUTION INTRAVENOUS CONTINUOUS
Status: DISCONTINUED | OUTPATIENT
Start: 2024-06-12 | End: 2024-06-14 | Stop reason: HOSPADM

## 2024-06-12 RX ADMIN — VANCOMYCIN HYDROCHLORIDE 750 MG: 750 INJECTION, POWDER, LYOPHILIZED, FOR SOLUTION INTRAVENOUS at 05:22

## 2024-06-12 RX ADMIN — CLONAZEPAM 0.5 MG: 0.5 TABLET ORAL at 00:27

## 2024-06-12 RX ADMIN — ENOXAPARIN SODIUM 30 MG: 100 INJECTION SUBCUTANEOUS at 09:12

## 2024-06-12 RX ADMIN — SODIUM CHLORIDE: 9 INJECTION, SOLUTION INTRAVENOUS at 09:06

## 2024-06-12 RX ADMIN — HYDROCODONE BITARTRATE AND ACETAMINOPHEN 2 TABLET: 5; 325 TABLET ORAL at 15:22

## 2024-06-12 RX ADMIN — TRAZODONE HYDROCHLORIDE 25 MG: 50 TABLET ORAL at 20:10

## 2024-06-12 RX ADMIN — ENOXAPARIN SODIUM 30 MG: 100 INJECTION SUBCUTANEOUS at 20:17

## 2024-06-12 RX ADMIN — FAMOTIDINE 20 MG: 20 TABLET, FILM COATED ORAL at 09:11

## 2024-06-12 RX ADMIN — HYDROCODONE BITARTRATE AND ACETAMINOPHEN 2 TABLET: 5; 325 TABLET ORAL at 00:27

## 2024-06-12 RX ADMIN — Medication 2 PUFF: at 19:30

## 2024-06-12 RX ADMIN — CEFEPIME 2000 MG: 2 INJECTION, POWDER, FOR SOLUTION INTRAVENOUS at 13:07

## 2024-06-12 RX ADMIN — NIFEDIPINE 30 MG: 30 TABLET, FILM COATED, EXTENDED RELEASE ORAL at 09:13

## 2024-06-12 RX ADMIN — QUETIAPINE FUMARATE 200 MG: 100 TABLET ORAL at 20:09

## 2024-06-12 RX ADMIN — CEFEPIME 2000 MG: 2 INJECTION, POWDER, FOR SOLUTION INTRAVENOUS at 00:26

## 2024-06-12 RX ADMIN — HYDROCODONE BITARTRATE AND ACETAMINOPHEN 2 TABLET: 5; 325 TABLET ORAL at 22:28

## 2024-06-12 RX ADMIN — CLONAZEPAM 0.5 MG: 0.5 TABLET ORAL at 20:10

## 2024-06-12 RX ADMIN — DULOXETINE HYDROCHLORIDE 90 MG: 30 CAPSULE, DELAYED RELEASE ORAL at 09:11

## 2024-06-12 RX ADMIN — LEVOTHYROXINE SODIUM 150 MCG: 0.15 TABLET ORAL at 05:22

## 2024-06-12 RX ADMIN — METOPROLOL TARTRATE 25 MG: 25 TABLET, FILM COATED ORAL at 20:09

## 2024-06-12 RX ADMIN — GABAPENTIN 300 MG: 300 CAPSULE ORAL at 20:10

## 2024-06-12 RX ADMIN — HYDROCORTISONE 10 MG: 10 TABLET ORAL at 20:10

## 2024-06-12 RX ADMIN — METOPROLOL TARTRATE 25 MG: 25 TABLET, FILM COATED ORAL at 09:11

## 2024-06-12 RX ADMIN — HYDROCODONE BITARTRATE AND ACETAMINOPHEN 2 TABLET: 5; 325 TABLET ORAL at 06:35

## 2024-06-12 RX ADMIN — GABAPENTIN 300 MG: 300 CAPSULE ORAL at 09:11

## 2024-06-12 RX ADMIN — Medication 2 PUFF: at 07:16

## 2024-06-12 RX ADMIN — MONTELUKAST 10 MG: 10 TABLET, FILM COATED ORAL at 20:10

## 2024-06-12 RX ADMIN — FLUDROCORTISONE ACETATE 100 MCG: 0.1 TABLET ORAL at 09:12

## 2024-06-12 RX ADMIN — FOLIC ACID 1 MG: 1 TABLET ORAL at 09:11

## 2024-06-12 ASSESSMENT — PAIN SCALES - GENERAL
PAINLEVEL_OUTOF10: 8
PAINLEVEL_OUTOF10: 6
PAINLEVEL_OUTOF10: 6
PAINLEVEL_OUTOF10: 7
PAINLEVEL_OUTOF10: 6
PAINLEVEL_OUTOF10: 10
PAINLEVEL_OUTOF10: 2

## 2024-06-12 ASSESSMENT — PAIN SCALES - WONG BAKER
WONGBAKER_NUMERICALRESPONSE: NO HURT

## 2024-06-12 ASSESSMENT — PAIN DESCRIPTION - LOCATION
LOCATION: HEAD
LOCATION: GENERALIZED
LOCATION: HEAD
LOCATION: HEAD

## 2024-06-12 ASSESSMENT — PAIN DESCRIPTION - ORIENTATION: ORIENTATION: MID

## 2024-06-12 ASSESSMENT — PAIN DESCRIPTION - DESCRIPTORS: DESCRIPTORS: ACHING

## 2024-06-12 ASSESSMENT — PAIN - FUNCTIONAL ASSESSMENT: PAIN_FUNCTIONAL_ASSESSMENT: ACTIVITIES ARE NOT PREVENTED

## 2024-06-12 NOTE — PROGRESS NOTES
[facility] Progress Note           Subjective/   68-year-old female past medical history of COPD, Crohn's disease, orthostatic hypotension, CKD stage III follows with Dr. Fuentes who presented to the ER on 6/10/2024 with complaints of worsening nausea, vomiting and headaches.  She was also noted to be hypoxic and requiring oxygen with a fever of 103.  Patient was noted to have possible pneumonia versus UTI and was admitted for sepsis.  Also noted to have acute kidney injury with a creatinine of 1.8 from a baseline of 1.3-1.4     No acute events in the last 24 hrs  Cr continues to worsen    cc and stool output about a Liter (ostomy)    Objective/      BP (!) 111/54   Pulse 90   Temp 97.7 °F (36.5 °C) (Oral)   Resp 16   Ht 1.753 m (5' 9\")   Wt 136.1 kg (300 lb)   SpO2 94%   BMI 44.30 kg/m²   Gen:  awake, nad    Skin: no petechial rash, turgor wnl  Heent:  eomi, mmm  Neck: supple, No jvd noted   Cardiovascular:  RRR, S1, S2  Respiratory: no audible wheeze , symmetric movement, rales in base  Abdomen:  soft , non tender  , ostomy   Ext: nontraumatic ,  no pedal edema  Psychiatric: Alert and oriented to self and place  Musculoskeletal:  moving extremities    Data/      Current Medications:    Scheduled Meds:   mometasone-formoterol  2 puff Inhalation BID    famotidine  20 mg Oral Daily    fludrocortisone  100 mcg Oral Daily    folic acid  1 mg Oral Daily    metoprolol tartrate  25 mg Oral BID    montelukast  10 mg Oral Nightly    NIFEdipine  30 mg Oral Daily    QUEtiapine  200 mg Oral Nightly    sodium chloride flush  5-40 mL IntraVENous 2 times per day    enoxaparin  30 mg SubCUTAneous BID    cefepime  2,000 mg IntraVENous Q12H    vancomycin  750 mg IntraVENous Q24H    DULoxetine  90 mg Oral Daily    gabapentin  300 mg Oral BID    hydrocortisone  10 mg Oral Nightly    levothyroxine  150 mcg Oral QAM AC    traZODone  25 mg Oral Nightly     Continuous Infusions:   sodium chloride  100 mL/hr at 06/12/24 0906    sodium chloride       PRN Meds:.aluminum & magnesium hydroxide-simethicone, clonazePAM, HYDROcodone-acetaminophen, traMADol, sodium chloride flush, sodium chloride, potassium chloride **OR** potassium alternative oral replacement **OR** potassium chloride, magnesium sulfate, polyethylene glycol, acetaminophen **OR** acetaminophen    Labs:  CBC:   Recent Labs     06/10/24  2358 06/11/24  0451 06/12/24  0500   WBC 18.4* 15.5* 8.4   HGB 11.3* 9.9* 8.9*   HCT 35.6* 31.4* 28.3*    231 192       BMP:   Recent Labs     06/10/24  2358 06/11/24  0451 06/12/24  0500   * 134* 138   K 4.2 4.9 4.2   CL 94* 99 101   CO2 27 26 27   BUN 26* 26* 29*   CREATININE 1.8* 1.7* 2.1*   GLUCOSE 130* 134* 109*          U/A:  Recent Labs     06/11/24  0037   COLORU Straw   PHUR 6.0   WBCUA *   RBCUA 0-2   BACTERIA 4+*   CLARITYU SL CLOUDY*   LEUKOCYTESUR MODERATE*   UROBILINOGEN 0.2   BILIRUBINUR Negative   BLOODU TRACE-INTACT*   GLUCOSEU Negative         Assessment:  Non-Oliguric Acute Kidney Injury on CKD Stage 3  -Follows with Dr. Walker/manfred  - Most Likely Due to prerenal KIMBERLI in setting of sepsis vs ATN vs less likely IRGN  - Baseline Cr 1.4-1.5 in 2024, worsened to 1.8 on consultation  -->2.1  - U/A with nitrites, leuk esterase, WBCs, 30 protein           Hyponatremia:  -Sodium 134 on presentation   improved         Anemia :   -Normocytic anemia      Hypertension :  -On the lower side     History of gout  -Stable     History of Crohn's disease  Stable     Sepsis from UTI/pneumonia  On antibiotics     Plan:   -Please check c 3 , c4 ruslan  -decrease  IV fluid rate  - cxr today  -Bladder scan today  -Daily weight, strict I's and O's  -Will continue to follow  -Keep vancomycin level less than 20  -Renally dose adjust gabapentin    Thank you for the consultation.  Please do not hesitate to call with questions.        ______________________________  Luz Dillard MD  [facility]

## 2024-06-12 NOTE — PROGRESS NOTES
Follow up due to leaking ostomy yesterday and denuded chaim stomal skin.  Today the current appliance remains intact. Will check on her again tomorrow.  The longer the current pouch can stay on the better for her chaim wound denuded skin.  Currently patient sitting up in chair.  No complaints. Patient states \"I'm happy with the good seal\".     06/12/24 1530   Ileostomy/Jejunostomy LLQ Ileostomy   Placement Date/Time: 01/23/09 0400   Location: [facility]  Ostomy Type: Ileostomy   Stomal Appliance 1 piece   Flange Size (inches) 1.75 Inches   Stoma  Assessment NANI   Peristomal Assessment NANI   Treatment Ostomy belt   Stool Appearance Loose;Watery   Stool Color Brown   Stool Amount Small

## 2024-06-12 NOTE — PROGRESS NOTES
Physical Therapy  Facility/Department: [facility] A2 CARD TELEMETRY  Daily Treatment Note  NAME: Jil Mcneill  : 1955  MRN: [id number]    Date of Service: 2024    Discharge Recommendations:  Long Term Care with PT   PT Equipment Recommendations  Equipment Needed: No  Other: pt owns needed DME    Patient Diagnosis(es): The primary encounter diagnosis was Acute respiratory failure with hypoxia (HCC). Diagnoses of Sepsis secondary to UTI (HCC) and Elevated troponin were also pertinent to this visit.    Assessment   Assessment: Pt participated well when seen for PT follow-up this date. Pt seen in conjunction with OT for co-treat session in light of current deficits, in order to safely progress functional mobility. Pt still very limited in strength and mobility, requiring maxA x 2 for transfers with support of Jose.  Pt too weak to perform sqaut-pivot transfers from bed>chair as she usually does at baseline.  Pt still limited in overall strength and level of (I) with mobility compared to baseline LOF. Continue to recommend pt return to LTC facility at D/C with \"Part B\" PT services to continue to gain strength and improve mobility after D/C from acute hospital.  Activity Tolerance: Patient tolerated treatment well;Patient limited by pain;Patient limited by endurance  Equipment Needed: No  Other: pt owns needed DME     Plan    Physical Therapy Plan  General Plan: 2-3 times per week  Specific Instructions for Next Treatment: Progress ther ex and mobility as tolerated  Current Treatment Recommendations: Strengthening;ROM;Balance training;Functional mobility training;Transfer training;Neuromuscular re-education;Home exercise program;Safety education & training;Equipment evaluation, education, & procurement;Modalities;Positioning;Therapeutic activities;Co-Treatment     Restrictions  Restrictions/Precautions  Restrictions/Precautions: General Precautions, Fall Risk  Position Activity Restriction  Other  case management note for discharge disposition.  Thank you.

## 2024-06-12 NOTE — PROGRESS NOTES
Hospital Medicine Progress Note      Date of Admission: 6/10/2024  Hospital Day: 3    Chief Admission Complaint:    Chief Complaint   Patient presents with    Nausea    Emesis    Headache     Subjective:     Pt feeling better today. Urine culture with E. Coli MDRO, currently on cefepime and continued.     Presenting Admission History:       Jil Mcneill is a 68 y.o. female with a history of CKD3, COPD, anemia, orthostatic hypotension, mood disorder, morbid obesity, innumerous drug allergies who presented tonight at NH after feeling sudden onset of fevers and chills and genearlized weakness. Denies dysuria or cough in the preceding days and not aware of ill contacts about her. Does endorse suprapubic discomfort. Not on home o2, requiring 5L in ED. Feeling a bit better after abx and fluid bolus. Admitted for further eval and care.      Vitals in the ED 98.1F, pulse 90, 108/52, 95 on 5L    Assessment/Plan:      Current Principal Problem:  Sepsis (HCC)    Sepsis in setting of pneumonia and E. Coli UTI. POA: leukocytosis, tachypnea, tachycardia  - CT chest with few scattered subtle nodular opacities in the lungs bilaterally with left basilar infiltrate concerning for infectious etiology. No acute abdominal or pelvic abnormality  - CXR with borderline cardiomegaly and minimal central pulmonary congestion or pulmonary artery hypertension. Mild bibasilar atelectasis or early infiltrates  - UA with positive nitrites, moderate leuk's, pyuria, 4+ bacteriuria  - Blood cultures with NGTD  - Urine culture with e. Coli MDRO, sensitive to rocephin  - Continue IV cefepime and vancomycin (many antibiotic drug allergies listed)  - Tele monitoring    KIMBERLI on CKD4  - Baseline Cr 1.2-1.4  - IVFs  - Avoid nephrotoxins as able  - Nephrology consulted, appreciate their input    Recurrent UTIs  - Urology consulted, appreciate their input    Hx of adrenal insufficiency, orthostatic hypotension  - On florinef, cortef    Anemia of chronic

## 2024-06-12 NOTE — PROGRESS NOTES
Occupational Therapy  Facility/Department: [facility] A2 CARD TELEMETRY  Daily Treatment Note  NAME: Jil Mcneill  : 1955  MRN: [id number]    Date of Service: 2024    Discharge Recommendations:  Long Term Care with OT  OT Equipment Recommendations  Equipment Needed: No    Patient Diagnosis(es): The primary encounter diagnosis was Acute respiratory failure with hypoxia (HCC). Diagnoses of Sepsis secondary to UTI (HCC) and Elevated troponin were also pertinent to this visit.     Assessment    Assessment: Co-tx collaboration this date to safely meet goals and will have better occupational performance outcomes with in a co-treatment than 1:1 session. Pt w/ fair tolerance to session remaining limited by endurance, activity tolerance, generalized weakness and pain. Pt completed bed mobility CGA + inc time for BLE mgmt 2/2 pain, max x2 STS c STEDY, total x2 transfer to chair c STEDY, and SUPV hair care in chair. Pt declining all additional ADLs offered 2/2 pain and requesting pain meds, RN notified and arrived at [facility] to provide meds. OT rec return LTC w/ OT, cont acute OT.   Activity Tolerance: Patient tolerated treatment well;Patient limited by endurance  Discharge Recommendations: Long Term Care with OT  Equipment Needed: No      Plan   Occupational Therapy Plan  Times Per Week: 2-3x  Current Treatment Recommendations: Balance training;Functional mobility training;ROM;Endurance training;Self-Care / ADL;Safety education & training     Restrictions  Restrictions/Precautions  Restrictions/Precautions: General Precautions;Fall Risk  Position Activity Restriction  Other position/activity restrictions: up with A , tele, IV, purewick    Subjective   Subjective  Subjective: pt supine in bed on approach, pleasant and agreeable to session, RN approved.  Pain: 8/10 back and BLE pain  Orientation  Overall Orientation Status: Within Functional Limits  Orientation Level: Oriented X4  Pain: Pt c/o chronic 8/10 pain in BLE at rest

## 2024-06-13 LAB
ANA SER QL IA: NEGATIVE
ANION GAP SERPL CALCULATED.3IONS-SCNC: 13 MMOL/L (ref 3–16)
BASOPHILS # BLD: 0 K/UL (ref 0–0.2)
BASOPHILS NFR BLD: 0.4 %
BUN SERPL-MCNC: 24 MG/DL (ref 7–20)
CALCIUM SERPL-MCNC: 8 MG/DL (ref 8.3–10.6)
CHLORIDE SERPL-SCNC: 105 MMOL/L (ref 99–110)
CO2 SERPL-SCNC: 22 MMOL/L (ref 21–32)
CREAT SERPL-MCNC: 1.6 MG/DL (ref 0.6–1.2)
DEPRECATED RDW RBC AUTO: 16.9 % (ref 12.4–15.4)
EOSINOPHIL # BLD: 0.2 K/UL (ref 0–0.6)
EOSINOPHIL NFR BLD: 3.1 %
GFR SERPLBLD CREATININE-BSD FMLA CKD-EPI: 34 ML/MIN/{1.73_M2}
GLUCOSE SERPL-MCNC: 131 MG/DL (ref 70–99)
HCT VFR BLD AUTO: 29.3 % (ref 36–48)
HGB BLD-MCNC: 9.3 G/DL (ref 12–16)
LYMPHOCYTES # BLD: 1.1 K/UL (ref 1–5.1)
LYMPHOCYTES NFR BLD: 14 %
MCH RBC QN AUTO: 30.4 PG (ref 26–34)
MCHC RBC AUTO-ENTMCNC: 31.9 G/DL (ref 31–36)
MCV RBC AUTO: 95.2 FL (ref 80–100)
MONOCYTES # BLD: 0.7 K/UL (ref 0–1.3)
MONOCYTES NFR BLD: 8.8 %
MRSA DNA SPEC QL NAA+PROBE: NORMAL
NEUTROPHILS # BLD: 5.6 K/UL (ref 1.7–7.7)
NEUTROPHILS NFR BLD: 73.7 %
PLATELET # BLD AUTO: 204 K/UL (ref 135–450)
PMV BLD AUTO: 7.9 FL (ref 5–10.5)
POTASSIUM SERPL-SCNC: 3.7 MMOL/L (ref 3.5–5.1)
RBC # BLD AUTO: 3.07 M/UL (ref 4–5.2)
SODIUM SERPL-SCNC: 140 MMOL/L (ref 136–145)
VANCOMYCIN TROUGH SERPL-MCNC: 14.2 UG/ML (ref 10–20)
WBC # BLD AUTO: 7.6 K/UL (ref 4–11)

## 2024-06-13 PROCEDURE — 80202 ASSAY OF VANCOMYCIN: CPT

## 2024-06-13 PROCEDURE — 36415 COLL VENOUS BLD VENIPUNCTURE: CPT

## 2024-06-13 PROCEDURE — 94761 N-INVAS EAR/PLS OXIMETRY MLT: CPT

## 2024-06-13 PROCEDURE — 6360000002 HC RX W HCPCS: Performed by: STUDENT IN AN ORGANIZED HEALTH CARE EDUCATION/TRAINING PROGRAM

## 2024-06-13 PROCEDURE — 6370000000 HC RX 637 (ALT 250 FOR IP): Performed by: NURSE PRACTITIONER

## 2024-06-13 PROCEDURE — 6360000002 HC RX W HCPCS: Performed by: NURSE PRACTITIONER

## 2024-06-13 PROCEDURE — 1200000000 HC SEMI PRIVATE

## 2024-06-13 PROCEDURE — 85025 COMPLETE CBC W/AUTO DIFF WBC: CPT

## 2024-06-13 PROCEDURE — 6370000000 HC RX 637 (ALT 250 FOR IP): Performed by: STUDENT IN AN ORGANIZED HEALTH CARE EDUCATION/TRAINING PROGRAM

## 2024-06-13 PROCEDURE — 94640 AIRWAY INHALATION TREATMENT: CPT

## 2024-06-13 PROCEDURE — 80048 BASIC METABOLIC PNL TOTAL CA: CPT

## 2024-06-13 PROCEDURE — 2580000003 HC RX 258: Performed by: STUDENT IN AN ORGANIZED HEALTH CARE EDUCATION/TRAINING PROGRAM

## 2024-06-13 PROCEDURE — 2580000003 HC RX 258: Performed by: NURSE PRACTITIONER

## 2024-06-13 PROCEDURE — 2700000000 HC OXYGEN THERAPY PER DAY

## 2024-06-13 RX ORDER — BUTALBITAL, ACETAMINOPHEN AND CAFFEINE 50; 325; 40 MG/1; MG/1; MG/1
1 TABLET ORAL EVERY 4 HOURS PRN
Status: DISCONTINUED | OUTPATIENT
Start: 2024-06-13 | End: 2024-06-14 | Stop reason: HOSPADM

## 2024-06-13 RX ORDER — BUTALBITAL, ACETAMINOPHEN AND CAFFEINE 50; 325; 40 MG/1; MG/1; MG/1
1 TABLET ORAL ONCE
Status: COMPLETED | OUTPATIENT
Start: 2024-06-13 | End: 2024-06-13

## 2024-06-13 RX ADMIN — ACETAMINOPHEN 650 MG: 325 TABLET ORAL at 09:20

## 2024-06-13 RX ADMIN — CLONAZEPAM 0.5 MG: 0.5 TABLET ORAL at 20:56

## 2024-06-13 RX ADMIN — SODIUM CHLORIDE, PRESERVATIVE FREE 10 ML: 5 INJECTION INTRAVENOUS at 21:55

## 2024-06-13 RX ADMIN — NIFEDIPINE 30 MG: 30 TABLET, FILM COATED, EXTENDED RELEASE ORAL at 09:19

## 2024-06-13 RX ADMIN — TRAMADOL HYDROCHLORIDE 50 MG: 50 TABLET ORAL at 09:19

## 2024-06-13 RX ADMIN — LEVOTHYROXINE SODIUM 150 MCG: 0.15 TABLET ORAL at 05:37

## 2024-06-13 RX ADMIN — CEFEPIME 2000 MG: 2 INJECTION, POWDER, FOR SOLUTION INTRAVENOUS at 13:24

## 2024-06-13 RX ADMIN — CLONAZEPAM 0.5 MG: 0.5 TABLET ORAL at 13:21

## 2024-06-13 RX ADMIN — TRAMADOL HYDROCHLORIDE 50 MG: 50 TABLET ORAL at 16:59

## 2024-06-13 RX ADMIN — TRAZODONE HYDROCHLORIDE 25 MG: 50 TABLET ORAL at 21:52

## 2024-06-13 RX ADMIN — MONTELUKAST 10 MG: 10 TABLET, FILM COATED ORAL at 21:53

## 2024-06-13 RX ADMIN — CLONAZEPAM 0.5 MG: 0.5 TABLET ORAL at 05:37

## 2024-06-13 RX ADMIN — Medication 2 PUFF: at 20:19

## 2024-06-13 RX ADMIN — METOPROLOL TARTRATE 25 MG: 25 TABLET, FILM COATED ORAL at 21:52

## 2024-06-13 RX ADMIN — DULOXETINE HYDROCHLORIDE 90 MG: 30 CAPSULE, DELAYED RELEASE ORAL at 09:19

## 2024-06-13 RX ADMIN — FAMOTIDINE 20 MG: 20 TABLET, FILM COATED ORAL at 09:19

## 2024-06-13 RX ADMIN — FOLIC ACID 1 MG: 1 TABLET ORAL at 09:19

## 2024-06-13 RX ADMIN — ACETAMINOPHEN 650 MG: 325 TABLET ORAL at 17:00

## 2024-06-13 RX ADMIN — CEFEPIME 2000 MG: 2 INJECTION, POWDER, FOR SOLUTION INTRAVENOUS at 00:43

## 2024-06-13 RX ADMIN — ENOXAPARIN SODIUM 30 MG: 100 INJECTION SUBCUTANEOUS at 09:20

## 2024-06-13 RX ADMIN — TRAMADOL HYDROCHLORIDE 50 MG: 50 TABLET ORAL at 23:25

## 2024-06-13 RX ADMIN — HYDROCORTISONE 10 MG: 10 TABLET ORAL at 21:52

## 2024-06-13 RX ADMIN — FLUDROCORTISONE ACETATE 100 MCG: 0.1 TABLET ORAL at 09:19

## 2024-06-13 RX ADMIN — HYDROCODONE BITARTRATE AND ACETAMINOPHEN 2 TABLET: 5; 325 TABLET ORAL at 04:41

## 2024-06-13 RX ADMIN — BUTALBITAL, ACETAMINOPHEN AND CAFFEINE 1 TABLET: 325; 50; 40 TABLET ORAL at 14:07

## 2024-06-13 RX ADMIN — Medication 2 PUFF: at 07:34

## 2024-06-13 RX ADMIN — VANCOMYCIN HYDROCHLORIDE 750 MG: 750 INJECTION, POWDER, LYOPHILIZED, FOR SOLUTION INTRAVENOUS at 06:01

## 2024-06-13 RX ADMIN — HYDROCODONE BITARTRATE AND ACETAMINOPHEN 2 TABLET: 5; 325 TABLET ORAL at 13:21

## 2024-06-13 RX ADMIN — ENOXAPARIN SODIUM 30 MG: 100 INJECTION SUBCUTANEOUS at 21:54

## 2024-06-13 RX ADMIN — QUETIAPINE FUMARATE 200 MG: 100 TABLET ORAL at 21:53

## 2024-06-13 RX ADMIN — METOPROLOL TARTRATE 25 MG: 25 TABLET, FILM COATED ORAL at 09:20

## 2024-06-13 RX ADMIN — GABAPENTIN 300 MG: 300 CAPSULE ORAL at 21:52

## 2024-06-13 RX ADMIN — GABAPENTIN 300 MG: 300 CAPSULE ORAL at 09:20

## 2024-06-13 RX ADMIN — BUTALBITAL, ACETAMINOPHEN AND CAFFEINE 1 TABLET: 325; 50; 40 TABLET ORAL at 20:04

## 2024-06-13 ASSESSMENT — PAIN DESCRIPTION - DESCRIPTORS
DESCRIPTORS: ACHING
DESCRIPTORS: STABBING
DESCRIPTORS: ACHING

## 2024-06-13 ASSESSMENT — PAIN SCALES - GENERAL
PAINLEVEL_OUTOF10: 8
PAINLEVEL_OUTOF10: 6
PAINLEVEL_OUTOF10: 6
PAINLEVEL_OUTOF10: 8
PAINLEVEL_OUTOF10: 9
PAINLEVEL_OUTOF10: 8
PAINLEVEL_OUTOF10: 6
PAINLEVEL_OUTOF10: 0
PAINLEVEL_OUTOF10: 8
PAINLEVEL_OUTOF10: 7
PAINLEVEL_OUTOF10: 7

## 2024-06-13 ASSESSMENT — PAIN SCALES - WONG BAKER
WONGBAKER_NUMERICALRESPONSE: NO HURT

## 2024-06-13 ASSESSMENT — PAIN - FUNCTIONAL ASSESSMENT
PAIN_FUNCTIONAL_ASSESSMENT: ACTIVITIES ARE NOT PREVENTED

## 2024-06-13 ASSESSMENT — PAIN DESCRIPTION - LOCATION
LOCATION: HEAD
LOCATION: BACK
LOCATION: HEAD
LOCATION: ABDOMEN

## 2024-06-13 ASSESSMENT — PAIN DESCRIPTION - ORIENTATION
ORIENTATION: MID
ORIENTATION: RIGHT;LEFT;LOWER;UPPER

## 2024-06-13 NOTE — PROGRESS NOTES
[facility]  [email or web address]  Office: [phone]    (This chart has been completed using [employer] Dictation Software. Although attempts have been made to ensure accuracy, words and/or phrases may not be transcribed as intended.)

## 2024-06-13 NOTE — PROGRESS NOTES
DOSE VANCOMYCIN  IP CONSULT TO NEPHROLOGY  IP CONSULT TO UROLOGY      This patient has a high likelihood of being discharged tomorrow and is appropriate for A1 Discharge Unit in AM pending clinical course overnight: []Yes  [x]No    ------------------------------------------------------------------------------------------------------------------------------------------------------------------------    [facility]  (this section is simply meant as an aid to accurate billing and does not necessarily reflect ongoing patient care which is documented elsewhere in the medical record)    [x] High (any 2)    A. Problems (any 1)  [x] Acute/Chronic Illness/injury posing threat to life or bodily function:  sepsis  [] Severe exacerbation of chronic illness:    ---------------------------------------------------------------------  B. Risk of Treatment (any 1)   [x] Drugs/treatments that require intensive monitoring for toxicity include:    [x] IV ABX requiring serial renal monitoring for nephrotoxicity:   vanco  [] Post-Cath/Contrast study requiring serial renal monitoring for Contrast Induced Nephropathy  [] IV Narcotic analgesia for ADR   [] Aggressive IV diuresis requiring serial monitoring for renal impairment and electrolyte derangements  [] Hypertonic Saline requiring serial renal monitoring for appropriate electrolyte correction rate   [] Critical electrolyte abnormalities requiring IV replacement and close serial monitoring  [] Insulin - monitoring FSBS for Hypoglycemic ADR  [] Anticoagulation requiring close serial monitoring and dose adjustments at high risk of ADR   [] HD requiring close serial monitoring of electrolytes and fluid status  [] Other -  [x] Change in code status:    [] Decision to escalate care:    [] Major surgery/procedure with associated risk factors:    ----------------------------------------------------------------------  C. Data (any 2)  [] Discussed management of the case with consultants as follows:     HGB 9.9* 8.9* 9.3*   HCT 31.4* 28.3* 29.3*    192 204       Recent Labs     06/11/24  0451 06/12/24  0500 06/13/24  0455   * 138 140   K 4.9 4.2 3.7   CL 99 101 105   CO2 26 27 22   BUN 26* 29* 24*   CREATININE 1.7* 2.1* 1.6*   CALCIUM 8.2* 7.7* 8.0*       Recent Labs     06/10/24  2358 06/11/24  0037 06/12/24  0500   PROBNP 388*  --  443*   TROPHS 54* 53*  --        No results for input(s): \"LABA1C\" in the last 72 hours.  Recent Labs     06/10/24  2358   AST 15   ALT 12   BILITOT 0.3   ALKPHOS 74       No results for input(s): \"INR\", \"LACTA\", \"TSH\" in the last 72 hours.    Urine Cultures:   Lab Results   Component Value Date/Time    LABURIN  06/11/2024 12:37 AM     >100,000 CFU/ml  Multiple Resistant Drug Organism       Blood Cultures:   Lab Results   Component Value Date/Time    BC  06/11/2024 01:13 AM     No Growth to date.  Any change in status will be called.     Lab Results   Component Value Date/Time    BLOODCULT2  06/11/2024 01:13 AM     No Growth to date.  Any change in status will be called.     Organism:   Lab Results   Component Value Date/Time    ORG Escherichia coli 06/11/2024 12:37 AM         RAMESH Barrientos

## 2024-06-13 NOTE — PLAN OF CARE
Problem: Discharge Planning  Goal: Discharge to home or other facility with appropriate resources  Outcome: Progressing     Problem: Discharge Planning  Goal: Discharge to home or other facility with appropriate resources  Outcome: Progressing     Problem: Pain  Goal: Verbalizes/displays adequate comfort level or baseline comfort level  Outcome: Progressing     Problem: Safety - Adult  Goal: Free from fall injury  Outcome: Progressing     Problem: Skin/Tissue Integrity  Goal: Absence of new skin breakdown  Description: 1.  Monitor for areas of redness and/or skin breakdown  2.  Assess vascular access sites hourly  3.  Every 4-6 hours minimum:  Change oxygen saturation probe site  4.  Every 4-6 hours:  If on nasal continuous positive airway pressure, respiratory therapy assess nares and determine need for appliance change or resting period.  Outcome: Progressing

## 2024-06-13 NOTE — CARE COORDINATION
Chart reviewed day #2.  Per MD: Cr improving, down to 1.6 now on gentle IVFs per nephro. Will monitor another day and hopefully able to discharge on oral abx tomorrow.     Patient is a long term resident at [facility] and plans to return with no barriers from CM standpoint.

## 2024-06-13 NOTE — PROGRESS NOTES
Follow up visit.  Patient reports the bag leaked this am but the nurse did  not use the supplies left in the room. Coloplast 2 piece product now in place and secure.  Belt not on.     Spoke to bed side RN (Jasvir).  He reports he did not see the products at bed side until the appliance was changed.  Instructed to please use the products at bed side including belt as these will be more successful in adhering to her large abdomin.  Follow order in chart for re-application if needed.    New order placed.  This is a difficult ostomy patient.  Has had numerous leakages since admission.  Ostomy care was able to obtain 48 hours of wear time with new product in room.  For 2/13/24-2/14/24 If pouch should leak, please follow orders in chart and use the products at bed side for one piece convex appliance.  Listen to the patient on how to change the ostomy bag.  If the ostomy leaks after 5 am 6/14/24,  DO NOT replace, leave a message for ostomy care and [facility] will change first thing on arrival @ 800 am.

## 2024-06-13 NOTE — PROGRESS NOTES
Patient in bed during bedside. Patient AXOX4. Patient c/o unrelenting headache. Dr dhillon d/t patient almost at max tylenol dosage. VSS. Colostomy intact, without leaks. Careplan discussed and patient understands and is agreeable.

## 2024-06-13 NOTE — PROGRESS NOTES
Pt Name: Jil Mcneill  Medical Record Number: [id number]  Date of Birth 1955   Today's Date: 6/13/2024      Subjective:  Patient is sleeping, did not attempt to wake.    ROS: Constitutional: No fever    Vitals:  Vitals:    06/12/24 2238 06/13/24 0421 06/13/24 0736 06/13/24 0919   BP: (!) 145/72 138/78  130/64   Pulse: (!) 103 (!) 102 98 86   Resp: 20 22 20 20   Temp: 97.9 °F (36.6 °C)   98.2 °F (36.8 °C)   TempSrc: Oral   Oral   SpO2: 96% 97% 97% 100%   Weight:       Height:         I/O last 3 completed shifts:  In: 480 [P.O.:480]  Out: 1800 [Urine:700; Stool:1100]    Exam:  General: Asleep, no acute distress  Respiratory: Nonlabored breathing  : spontaneously voiding    CURRENT MEDICATIONS   Scheduled Meds:   [id number]        [id number]        [START ON 6/14/2024] vancomycin  750 mg IntraVENous Q24H    mometasone-formoterol  2 puff Inhalation BID    famotidine  20 mg Oral Daily    fludrocortisone  100 mcg Oral Daily    folic acid  1 mg Oral Daily    metoprolol tartrate  25 mg Oral BID    montelukast  10 mg Oral Nightly    NIFEdipine  30 mg Oral Daily    QUEtiapine  200 mg Oral Nightly    sodium chloride flush  5-40 mL IntraVENous 2 times per day    enoxaparin  30 mg SubCUTAneous BID    cefepime  2,000 mg IntraVENous Q12H    DULoxetine  90 mg Oral Daily    gabapentin  300 mg Oral BID    hydrocortisone  10 mg Oral Nightly    levothyroxine  150 mcg Oral QAM AC    traZODone  25 mg Oral Nightly     Continuous Infusions:   sodium chloride 50 mL/hr at 06/12/24 1126    sodium chloride       PRN Meds:.[id number], [id number], aluminum & magnesium hydroxide-simethicone, clonazePAM, HYDROcodone-acetaminophen, traMADol, sodium chloride flush, sodium chloride, potassium chloride **OR** potassium alternative oral replacement **OR** potassium chloride, magnesium sulfate, polyethylene glycol, acetaminophen **OR** acetaminophen    LABS     Recent Labs     06/10/24  2358 06/11/24  0451 06/12/24  0500 06/13/24  0455   WBC 18.4*

## 2024-06-14 VITALS
HEART RATE: 96 BPM | OXYGEN SATURATION: 95 % | TEMPERATURE: 98.4 F | RESPIRATION RATE: 22 BRPM | DIASTOLIC BLOOD PRESSURE: 92 MMHG | BODY MASS INDEX: 43.4 KG/M2 | WEIGHT: 293 LBS | SYSTOLIC BLOOD PRESSURE: 168 MMHG | HEIGHT: 69 IN

## 2024-06-14 LAB
ALBUMIN SERPL ELPH-MCNC: 2.4 G/DL (ref 3.1–4.9)
ALPHA1 GLOB SERPL ELPH-MCNC: 0.3 G/DL (ref 0.2–0.4)
ALPHA2 GLOB SERPL ELPH-MCNC: 1 G/DL (ref 0.4–1.1)
B-GLOBULIN SERPL ELPH-MCNC: 1.1 G/DL (ref 0.9–1.6)
GAMMA GLOB SERPL ELPH-MCNC: 1.3 G/DL (ref 0.6–1.8)
INR PPP: 0.95 (ref 0.85–1.15)
KAPPA LC FREE SER-MCNC: 57.08 MG/L (ref 3.3–19.4)
KAPPA LC FREE/LAMBDA FREE SER: 1.33 {RATIO} (ref 0.26–1.65)
LAMBDA LC FREE SERPL-MCNC: 43.07 MG/L (ref 5.71–26.3)
PROT SERPL-MCNC: 6.1 G/DL (ref 6.4–8.2)
PROTHROMBIN TIME: 12.9 SEC (ref 11.9–14.9)
RPT COMMENT: ABNORMAL
SPE/IFE INTERPRETATION: NORMAL

## 2024-06-14 PROCEDURE — 2580000003 HC RX 258: Performed by: INTERNAL MEDICINE

## 2024-06-14 PROCEDURE — 97110 THERAPEUTIC EXERCISES: CPT

## 2024-06-14 PROCEDURE — 6370000000 HC RX 637 (ALT 250 FOR IP): Performed by: NURSE PRACTITIONER

## 2024-06-14 PROCEDURE — 6360000002 HC RX W HCPCS: Performed by: NURSE PRACTITIONER

## 2024-06-14 PROCEDURE — 2580000003 HC RX 258: Performed by: STUDENT IN AN ORGANIZED HEALTH CARE EDUCATION/TRAINING PROGRAM

## 2024-06-14 PROCEDURE — 97530 THERAPEUTIC ACTIVITIES: CPT

## 2024-06-14 PROCEDURE — 85610 PROTHROMBIN TIME: CPT

## 2024-06-14 PROCEDURE — 94761 N-INVAS EAR/PLS OXIMETRY MLT: CPT

## 2024-06-14 PROCEDURE — 6360000002 HC RX W HCPCS: Performed by: STUDENT IN AN ORGANIZED HEALTH CARE EDUCATION/TRAINING PROGRAM

## 2024-06-14 PROCEDURE — 36415 COLL VENOUS BLD VENIPUNCTURE: CPT

## 2024-06-14 PROCEDURE — 2700000000 HC OXYGEN THERAPY PER DAY

## 2024-06-14 PROCEDURE — 94640 AIRWAY INHALATION TREATMENT: CPT

## 2024-06-14 PROCEDURE — 6370000000 HC RX 637 (ALT 250 FOR IP): Performed by: STUDENT IN AN ORGANIZED HEALTH CARE EDUCATION/TRAINING PROGRAM

## 2024-06-14 PROCEDURE — 2580000003 HC RX 258: Performed by: NURSE PRACTITIONER

## 2024-06-14 PROCEDURE — 6360000002 HC RX W HCPCS: Performed by: INTERNAL MEDICINE

## 2024-06-14 RX ORDER — SODIUM CHLORIDE 0.9 % (FLUSH) 0.9 %
5-40 SYRINGE (ML) INJECTION EVERY 12 HOURS SCHEDULED
Status: DISCONTINUED | OUTPATIENT
Start: 2024-06-14 | End: 2024-06-14 | Stop reason: SDUPTHER

## 2024-06-14 RX ORDER — SODIUM CHLORIDE 0.9 % (FLUSH) 0.9 %
5-40 SYRINGE (ML) INJECTION PRN
Status: DISCONTINUED | OUTPATIENT
Start: 2024-06-14 | End: 2024-06-14 | Stop reason: SDUPTHER

## 2024-06-14 RX ORDER — LORAZEPAM 2 MG/ML
INJECTION INTRAMUSCULAR
Status: DISCONTINUED
Start: 2024-06-14 | End: 2024-06-14 | Stop reason: HOSPADM

## 2024-06-14 RX ORDER — SODIUM CHLORIDE 9 MG/ML
INJECTION, SOLUTION INTRAVENOUS PRN
Status: DISCONTINUED | OUTPATIENT
Start: 2024-06-14 | End: 2024-06-14 | Stop reason: HOSPADM

## 2024-06-14 RX ORDER — HYDROCODONE BITARTRATE AND ACETAMINOPHEN 5; 325 MG/1; MG/1
2 TABLET ORAL EVERY 6 HOURS PRN
Qty: 18 TABLET | Refills: 0 | Status: SHIPPED | OUTPATIENT
Start: 2024-06-14 | End: 2024-06-17

## 2024-06-14 RX ORDER — TRAZODONE HYDROCHLORIDE 50 MG/1
25 TABLET ORAL NIGHTLY
Qty: 2 TABLET | Refills: 0 | Status: SHIPPED | OUTPATIENT
Start: 2024-06-14 | End: 2024-06-18

## 2024-06-14 RX ORDER — LEVOTHYROXINE SODIUM 0.15 MG/1
150 TABLET ORAL
Qty: 30 TABLET | Refills: 3 | DISCHARGE
Start: 2024-06-15

## 2024-06-14 RX ORDER — GABAPENTIN 600 MG/1
300 TABLET ORAL 3 TIMES DAILY
Qty: 180 TABLET | Refills: 0 | Status: SHIPPED | OUTPATIENT
Start: 2024-06-14 | End: 2024-10-12

## 2024-06-14 RX ORDER — DULOXETIN HYDROCHLORIDE 30 MG/1
90 CAPSULE, DELAYED RELEASE ORAL DAILY
Qty: 30 CAPSULE | Refills: 3 | DISCHARGE
Start: 2024-06-15

## 2024-06-14 RX ORDER — LORAZEPAM 2 MG/ML
0.5 INJECTION INTRAMUSCULAR ONCE
Status: COMPLETED | OUTPATIENT
Start: 2024-06-14 | End: 2024-06-14

## 2024-06-14 RX ADMIN — LEVOTHYROXINE SODIUM 150 MCG: 0.15 TABLET ORAL at 06:18

## 2024-06-14 RX ADMIN — TRAMADOL HYDROCHLORIDE 50 MG: 50 TABLET ORAL at 08:06

## 2024-06-14 RX ADMIN — NIFEDIPINE 30 MG: 30 TABLET, FILM COATED, EXTENDED RELEASE ORAL at 08:06

## 2024-06-14 RX ADMIN — VANCOMYCIN HYDROCHLORIDE 750 MG: 750 INJECTION, POWDER, LYOPHILIZED, FOR SOLUTION INTRAVENOUS at 06:25

## 2024-06-14 RX ADMIN — SODIUM CHLORIDE: 9 INJECTION, SOLUTION INTRAVENOUS at 12:32

## 2024-06-14 RX ADMIN — CEFEPIME 2000 MG: 2 INJECTION, POWDER, FOR SOLUTION INTRAVENOUS at 12:32

## 2024-06-14 RX ADMIN — HYDROCODONE BITARTRATE AND ACETAMINOPHEN 2 TABLET: 5; 325 TABLET ORAL at 12:26

## 2024-06-14 RX ADMIN — CLONAZEPAM 0.5 MG: 0.5 TABLET ORAL at 15:06

## 2024-06-14 RX ADMIN — CEFEPIME 2000 MG: 2 INJECTION, POWDER, FOR SOLUTION INTRAVENOUS at 01:04

## 2024-06-14 RX ADMIN — METOPROLOL TARTRATE 25 MG: 25 TABLET, FILM COATED ORAL at 08:06

## 2024-06-14 RX ADMIN — FOLIC ACID 1 MG: 1 TABLET ORAL at 08:06

## 2024-06-14 RX ADMIN — DULOXETINE HYDROCHLORIDE 90 MG: 30 CAPSULE, DELAYED RELEASE ORAL at 08:06

## 2024-06-14 RX ADMIN — Medication 2 PUFF: at 07:41

## 2024-06-14 RX ADMIN — SODIUM CHLORIDE, PRESERVATIVE FREE 10 ML: 5 INJECTION INTRAVENOUS at 12:26

## 2024-06-14 RX ADMIN — ENOXAPARIN SODIUM 30 MG: 100 INJECTION SUBCUTANEOUS at 08:07

## 2024-06-14 RX ADMIN — CLONAZEPAM 0.5 MG: 0.5 TABLET ORAL at 08:06

## 2024-06-14 RX ADMIN — FAMOTIDINE 20 MG: 20 TABLET, FILM COATED ORAL at 08:06

## 2024-06-14 RX ADMIN — LORAZEPAM 0.5 MG: 2 INJECTION, SOLUTION INTRAMUSCULAR; INTRAVENOUS at 19:39

## 2024-06-14 RX ADMIN — GABAPENTIN 300 MG: 300 CAPSULE ORAL at 08:06

## 2024-06-14 RX ADMIN — HYDROCODONE BITARTRATE AND ACETAMINOPHEN 2 TABLET: 5; 325 TABLET ORAL at 18:36

## 2024-06-14 RX ADMIN — BUTALBITAL, ACETAMINOPHEN AND CAFFEINE 1 TABLET: 325; 50; 40 TABLET ORAL at 19:40

## 2024-06-14 RX ADMIN — BUTALBITAL, ACETAMINOPHEN AND CAFFEINE 1 TABLET: 325; 50; 40 TABLET ORAL at 06:18

## 2024-06-14 ASSESSMENT — PAIN SCALES - GENERAL
PAINLEVEL_OUTOF10: 8
PAINLEVEL_OUTOF10: 9

## 2024-06-14 ASSESSMENT — PAIN - FUNCTIONAL ASSESSMENT
PAIN_FUNCTIONAL_ASSESSMENT: ACTIVITIES ARE NOT PREVENTED
PAIN_FUNCTIONAL_ASSESSMENT: ACTIVITIES ARE NOT PREVENTED

## 2024-06-14 ASSESSMENT — PAIN DESCRIPTION - DESCRIPTORS: DESCRIPTORS: ACHING

## 2024-06-14 ASSESSMENT — PAIN DESCRIPTION - LOCATION
LOCATION: HEAD

## 2024-06-14 NOTE — PROGRESS NOTES
Physical Therapy  Facility/Department: [facility] A2 CARD TELEMETRY  Daily Treatment Note  NAME: Jil Mcneill  : 1955  MRN: [id number]    Date of Service: 2024    Discharge Recommendations:  Long Term Care with PT   PT Equipment Recommendations  Equipment Needed: No  Other: pt owns needed DME    Patient Diagnosis(es): The primary encounter diagnosis was Acute respiratory failure with hypoxia (HCC). Diagnoses of Sepsis secondary to UTI (HCC), Elevated troponin, and Chronic pain syndrome were also pertinent to this visit.    Assessment   Assessment: Pt seen in conjunction with OT for co-treat session in light of current deficits, in order to safely progress functional mobility. Pt declined transfer training this date (in recliner and completes sit pivots at baseline). Pt still limited in overall strength and level of (I) with mobility compared to baseline LOF. Continue to recommend pt return to LTC facility at D/C with \"Part B\" PT services to continue to gain strength and improve mobility after D/C from acute hospital.  Activity Tolerance: Patient limited by endurance  Equipment Needed: No  Other: pt owns needed DME     Plan    Physical Therapy Plan  General Plan: 2-3 times per week  Specific Instructions for Next Treatment: Progress ther ex and mobility as tolerated  Current Treatment Recommendations: Strengthening;ROM;Balance training;Functional mobility training;Transfer training;Neuromuscular re-education;Home exercise program;Safety education & training;Equipment evaluation, education, & procurement;Modalities;Positioning;Therapeutic activities;Co-Treatment     Restrictions  Restrictions/Precautions  Restrictions/Precautions: General Precautions, Fall Risk  Position Activity Restriction  Other position/activity restrictions: up with A , tele, IV, purewick     Subjective    Subjective  Subjective: Pt agreeable to work with therapy this afternoon.  Pain: Pt does not c/o pain during  session.  Orientation  Overall Orientation Status: Within Normal Limits  Orientation Level: Oriented to person;Oriented to place;Oriented to situation;Oriented to time  Cognition  Overall Cognitive Status: Exceptions  Attention Span: Difficulty dividing attention  Sequencing: Requires cues for some     Objective   Vitals  Pulse: 89  BP: (!) 157/100  MAP (Calculated): 119  SpO2: 96 %  O2 Device: Nasal cannula (1.5 L)  Bed Mobility Training  Bed Mobility Training: No (pt in chair at start and EOS)  Balance  Sitting: Intact (able to sit upright in recliner before sitting back again)  Standing:  (NT)     PT Exercises  Exercise Treatment: performed  Other Specialty Interventions  Other Treatments/Modalities: Pt performed scooting to edge of chair min A and chair sit-ups x 10  Safety Devices  Type of Devices: Left in chair;Call light within reach;Nurse notified;Chair alarm in place       Goals  Short Term Goals  Time Frame for Short Term Goals: 1 week, 6/18/24, unless otherwise indicated  -continued 6/14  Short Term Goal 1: pt will transfer supine to and from sit with supervision by 6/15  Short Term Goal 2: pt will transfer bed to and from chair with Min A  Short Term Goal 3: pt will demonstrate and tolerate 10 reps of B LE therex  Patient Goals   Patient Goals : \"To go back to [facility]\"    Education  Patient Education  Education Given To: Patient  Education Provided: Role of Therapy;Plan of Care;Precautions;Transfer Training;Fall Prevention Strategies;Equipment  Education Provided Comments: Pt educated on role of PT in acute setting and benefits of regular OOB activity in order to maintain/improve strength and lessen likelihood of developing hospital-acquired weakness; pt verbalizes understanding.  Education Method: Demonstration;Verbal  Barriers to Learning: None  Education Outcome: Verbalized understanding;Demonstrated understanding;Continued education needed    AM-PAC - Mobility    AM-PAC Basic Mobility -

## 2024-06-14 NOTE — PROGRESS NOTES
[facility] Progress Note           Subjective/   68-year-old female past medical history of COPD, Crohn's disease, orthostatic hypotension, CKD stage III follows with Dr. Fuentes who presented to the ER on 6/10/2024 with complaints of worsening nausea, vomiting and headaches.  She was also noted to be hypoxic and requiring oxygen with a fever of 103.  Patient was noted to have possible pneumonia versus UTI and was admitted for sepsis.  Also noted to have acute kidney injury with a creatinine of 1.8 from a baseline of 1.3-1.4     No acute events in the last 24 hrs  Cr starting to improve  UOP not charted      Denies any active complaints    Objective/      BP (!) 150/79   Pulse 90   Temp 97.9 °F (36.6 °C) (Oral)   Resp 18   Ht 1.753 m (5' 9\")   Wt 136.1 kg (300 lb)   SpO2 94%   BMI 44.30 kg/m²   Gen:  awake, nad    Skin: no petechial rash, turgor wnl  Heent:  eomi, mmm  Neck: supple, No jvd noted   Cardiovascular:  RRR, S1, S2  Respiratory: no audible wheeze , symmetric movement, rales in base  Abdomen:  soft , non tender  , ostomy   Ext: nontraumatic ,  no pedal edema  Psychiatric: Alert and oriented to self and place  Musculoskeletal:  moving extremities    Data/      Current Medications:    Scheduled Meds:   mometasone-formoterol  2 puff Inhalation BID    famotidine  20 mg Oral Daily    fludrocortisone  100 mcg Oral Daily    folic acid  1 mg Oral Daily    metoprolol tartrate  25 mg Oral BID    montelukast  10 mg Oral Nightly    NIFEdipine  30 mg Oral Daily    QUEtiapine  200 mg Oral Nightly    sodium chloride flush  5-40 mL IntraVENous 2 times per day    enoxaparin  30 mg SubCUTAneous BID    cefepime  2,000 mg IntraVENous Q12H    DULoxetine  90 mg Oral Daily    gabapentin  300 mg Oral BID    hydrocortisone  10 mg Oral Nightly    levothyroxine  150 mcg Oral QAM AC    traZODone  25 mg Oral Nightly     Continuous Infusions:   sodium chloride      sodium chloride 50 mL/hr at 06/12/24  consultation.  Please do not hesitate to call with questions.        ______________________________  Luz Dillard MD  [facility]  [email or web address]  Office: [phone]    (This chart has been completed using [employer] Dictation Software. Although attempts have been made to ensure accuracy, words and/or phrases may not be transcribed as intended.)

## 2024-06-14 NOTE — PROGRESS NOTES
Pt removed IV by mistake. New IV established. Meds given for ongoing headache 8/10 pain. Will monitor.

## 2024-06-14 NOTE — CARE COORDINATION
CASE MANAGEMENT DISCHARGE SUMMARY      Discharge to: [facility]     Precertification completed: yes   Hospital Exemption Notification (HENS) completed: n/a    IMM given: (date) today  Follow-Up copy of Important Message from Medicare (IMM2) has been explained to patient and/or designated healthcare decision maker (HCDM). Pt and/or HCDM aware that patient is permitted to stay an additional 4 hours prior to discharge to consider an appeal if they feel as though they are being discharged too soon. Patient may discharge as planned if chooses to do so.  Patient/HCDM voice no other concerns or questions regarding this process.       Transportation: ambulance    Medical Transport explained to pt/family. Pt/family voice no agency preference.    Agency used:[facility]   time:7pm   Ambulance form completed: Yes    Confirmed discharge plan with:patient/nephew/RN/Ronit with [facility]      Patient: yes     Family:  yes    Name: Contact number:     Facility/Agency, name:  [facility] faxed   Phone number for report to facility: [phone]     RN, name: Court     Note: Discharging nurse to complete [facility], reconcile [facility], and place final copy with patient's discharge packet. RN to ensure that written prescriptions for  Level II medications are sent with patient to the facility as per protocol.

## 2024-06-14 NOTE — PROGRESS NOTES
Transport came to  patient. Patient anxious and felt nausea. 's . Paged to crosscover. Placed G22 IV on left hand. 1 dose of 0.5 mg Ativan IV given per order. Fioricet given per MAR.     BP rechecked: 168/92  Valuables sent with patient.

## 2024-06-14 NOTE — DISCHARGE INSTRUCTIONS
ceFEPIme (MAXIPIME) 2,000 mg in sodium chloride 0.9 % 100 mL IVPB (mini-bag)  2,000 mg, IntraVENous, at 25 mL/hr, Administer over 240 Minutes, EVERY 12 HOURS, First dose on Tue 6/11/24 at 1300, For 7 days

## 2024-06-14 NOTE — PROGRESS NOTES
Occupational Therapy  Facility/Department: [facility] A2 CARD TELEMETRY  Daily Treatment Note  NAME: Jil Mcneill  : 1955  MRN: [id number]    Date of Service: 2024    Discharge Recommendations:  Long Term Care with OT         Patient Diagnosis(es): The primary encounter diagnosis was Acute respiratory failure with hypoxia (HCC). Diagnoses of Sepsis secondary to UTI (HCC), Elevated troponin, and Chronic pain syndrome were also pertinent to this visit.     Assessment    Assessment: Pt tolerated session fair overall, requires increased time for some tasks, rest breaks with all seated therex. Pt declined BADLs and transfer training this date (in recliner and completes sit pivots at baseline). Pt functioning below her baseline, would benefit from OT when she returns to LTC facility. Continue OT per POC.  Activity Tolerance: Patient limited by endurance  Discharge Recommendations: Long Term Care with OT      Plan   Occupational Therapy Plan  Times Per Week: 2-3x     Restrictions  Restrictions/Precautions  Restrictions/Precautions: General Precautions;Fall Risk  Position Activity Restriction  Other position/activity restrictions: up with A , tele, IV, purewick    Subjective   Subjective  Subjective: Pt up in recliner at entry, just placed in chair by PCA. Pt agreeable to OT treatment in chair, declines transfer training.    Orientation  Overall Orientation Status: Within Normal Limits  Orientation Level: Oriented to person;Oriented to place;Oriented to situation;Oriented to time    Pain: Pt does not c/o pain during session.    Cognition  Overall Cognitive Status: Exceptions  Attention Span: Difficulty dividing attention  Sequencing: Requires cues for some        Objective    Vitals  Vitals:    24 1313   BP: (!) 157/100   Pulse: 89   Resp:    Temp:    SpO2: 96%        Bed Mobility Training  Bed Mobility Training: No (pt in chair at start and EOS)    Balance  Sitting: Intact (able to sit upright in recliner off

## 2024-06-14 NOTE — DISCHARGE INSTR - COC
out hang to dry so you have 2 belts at all l times.          Supplies sent with patient:  Esteem convex Convetec one piece appliance - 3 one piece appliance sent with patient in supply bag  Barrier wipes  Barrier spray and wipes  Barrier extenders  Scissors  Extra belt  Stoma pattern  Instructions with photo's placed in supply bag    Date of Last BM: 6/14/24    Intake/Output Summary (Last 24 hours) at 6/14/2024 0956  Last data filed at 6/14/2024 0934  Gross per 24 hour   Intake 230 ml   Output 2300 ml   Net -2070 ml     I/O last 3 completed shifts:  In: 350 [P.O.:350]  Out: 1500 [Urine:700; Stool:800]    Safety Concerns:     At Risk for Falls    Impairments/Disabilities:      Amputation - ubaldo    Nutrition Therapy:  Current Nutrition Therapy:   - Oral Diet:  General    Routes of Feeding: Oral  Liquids: No Restrictions  Daily Fluid Restriction: no  Last Modified Barium Swallow with Video (Video Swallowing Test): not done    Treatments at the Time of Hospital Discharge:   Respiratory Treatments:   Oxygen Therapy:  is on oxygen at 1 L/min per nasal cannula.  Ventilator:    - No ventilator support    Rehab Therapies:   Weight Bearing Status/Restrictions: No weight bearing restrictions  Other Medical Equipment (for information only, NOT a DME order):    Other Treatments:     Patient's personal belongings (please select all that are sent with patient):       RN SIGNATURE:  Electronically signed by Court Steven RN on 6/14/24 at 10:24 AM EDT    CASE MANAGEMENT/SOCIAL WORK SECTION    Inpatient Status Date:     Readmission Risk Assessment Score:  Readmission Risk              Risk of Unplanned Readmission:  31           Discharging to Facility/ Agency   Name: [facility]  Address:  Phone:[phone]  Fax:    Dialysis Facility (if applicable)   Name:  Address:  Dialysis Schedule:  Phone:  Fax:    / signature: Electronically signed by Nati Salazar RN on 6/14/24 at 12:43 PM EDT    PHYSICIAN  SECTION    Prognosis: Fair    Condition at Discharge: Stable    Rehab Potential (if transferring to Rehab): Fair    Recommended Labs or Other Treatments After Discharge: Weekly BMP for 1 month.    ceFEPIme (MAXIPIME) 2,000 mg in sodium chloride 0.9 % 100 mL IVPB (mini-bag)  2,000 mg, IntraVENous, at 25 mL/hr, Administer over 240 Minutes, EVERY 12 HOURS, First dose on Tue 6/11/24, for 5 days (6/14/2024 - 6/18/2024)    Ileostomy care:  Stoma Care - Existing ileostomy - Patient to empty appliance when 1/3 to 1/2 full with assistance of the staff. Cleanse inside and outside of the drain spout prior to rolling closed. Stoma size:  28 mm. Red, protrudes, junction intact. Appliance size:  Convetec Convex one piece esteem body appliance.  Crust with stoma powder then barrier spray.  One paste ring around stoma. One paste strip 1/2 each) in crease toward naval.  Barrier extenders around stoma pouch. Change appliance every 3-5 days or 1-2 times a week. See photo's above for how to place paste and paste rings.  If paste rings and strips are not available, OK to use the stoma paste.    Physician Certification: I certify the above information and transfer of Jil Mcneill  is necessary for the continuing treatment of the diagnosis listed and that she requires Skilled Nursing Facility for greater 30 days.     Update Admission H&P: No change in H&P    PHYSICIAN SIGNATURE:  Electronically signed by RAMESH Solorio CNP/ Savage Jeronimo MD on 6/14/24 at 9:57 AM EDT

## 2024-06-14 NOTE — PROGRESS NOTES
DC instructions completed. Pt verbalized understanding. IV removed no complications. Tele box removed returned. Belongings gathered. Awaiting transport.

## 2024-06-14 NOTE — DISCHARGE SUMMARY
Inhaler, Refills: 0       !! - Potential duplicate medications found. Please discuss with provider.        Current Discharge Medication List        STOP taking these medications       HYDROcodone-acetaminophen (NORCO) 7.5-325 MG per tablet Comments:   Reason for Stopping:         HYDROcodone-acetaminophen (NORCO)  MG per tablet Comments:   Reason for Stopping:         aluminum & magnesium hydroxide-simethicone (ALMACONE DOUBLE STRENGTH) 400-400-40 MG/5ML SUSP Comments:   Reason for Stopping:         busPIRone (BUSPAR) 5 MG tablet Comments:   Reason for Stopping:         calcitRIOL (ROCALTROL) 0.25 MCG capsule Comments:   Reason for Stopping:         ciprofloxacin (CIPRO) 500 MG tablet Comments:   Reason for Stopping:         divalproex (DEPAKOTE) 500 MG DR tablet Comments:   Reason for Stopping:         ketoconazole (NIZORAL) 2 % cream Comments:   Reason for Stopping:         meclizine (ANTIVERT) 12.5 MG tablet Comments:   Reason for Stopping:         meclizine (ANTIVERT) 25 MG CHEW Comments:   Reason for Stopping:         miconazole (MICOTIN) 2 % powder Comments:   Reason for Stopping:         nystatin (MYCOSTATIN) 359840 UNIT/GM ointment Comments:   Reason for Stopping:         traMADol (ULTRAM) 50 MG tablet Comments:   Reason for Stopping:         tiotropium (SPIRIVA HANDIHALER) 18 MCG inhalation capsule Comments:   Reason for Stopping:         ipratropium-albuterol (DUONEB) 0.5-2.5 (3) MG/3ML SOLN nebulizer solution Comments:   Reason for Stopping:         budesonide-formoterol (SYMBICORT) 160-4.5 MCG/ACT AERO Comments:   Reason for Stopping:         ranitidine (ZANTAC) 150 MG tablet Comments:   Reason for Stopping:         temazepam (RESTORIL) 15 MG capsule Comments:   Reason for Stopping:               Significant Test Results    XR CHEST PORTABLE    Result Date: 6/12/2024  EXAMINATION: ONE XRAY VIEW OF THE CHEST 6/12/2024 8:54 am COMPARISON: June 11, 2024 chest radiograph and CT from the same day      Recent Labs     06/12/24  0500 06/13/24  0455    140   K 4.2 3.7    105   CO2 27 22   BUN 29* 24*   CREATININE 2.1* 1.6*   CALCIUM 7.7* 8.0*     Recent Labs     06/12/24  0500   PROBNP 443*     No results for input(s): \"LABA1C\" in the last 72 hours.  No results for input(s): \"AST\", \"ALT\", \"BILIDIR\", \"BILITOT\", \"ALKPHOS\" in the last 72 hours.  Recent Labs     06/14/24  1014   INR 0.95       Urine Cultures:   Lab Results   Component Value Date/Time    LABURIN  06/11/2024 12:37 AM     >100,000 CFU/ml  Multiple Resistant Drug Organism       Blood Cultures:   Lab Results   Component Value Date/Time    BC  06/11/2024 01:13 AM     No Growth to date.  Any change in status will be called.     Lab Results   Component Value Date/Time    BLOODCULT2  06/11/2024 01:13 AM     No Growth to date.  Any change in status will be called.     Organism:   Lab Results   Component Value Date/Time    ORG Escherichia coli 06/11/2024 12:37 AM       Signed:    RAMESH Solorio - CNP

## 2024-06-14 NOTE — PLAN OF CARE
Problem: Discharge Planning  Goal: Discharge to home or other facility with appropriate resources  Outcome: Progressing  Flowsheets (Taken 6/13/2024 2116)  Discharge to home or other facility with appropriate resources:   Identify barriers to discharge with patient and caregiver   Arrange for needed discharge resources and transportation as appropriate   Identify discharge learning needs (meds, wound care, etc)     Problem: Pain  Goal: Verbalizes/displays adequate comfort level or baseline comfort level  Outcome: Progressing  Flowsheets (Taken 6/13/2024 2116)  Verbalizes/displays adequate comfort level or baseline comfort level:   Encourage patient to monitor pain and request assistance   Assess pain using appropriate pain scale   Administer analgesics based on type and severity of pain and evaluate response   Implement non-pharmacological measures as appropriate and evaluate response     Problem: Safety - Adult  Goal: Free from fall injury  Outcome: Progressing     Problem: Skin/Tissue Integrity  Goal: Absence of new skin breakdown  Description: 1.  Monitor for areas of redness and/or skin breakdown  2.  Assess vascular access sites hourly  3.  Every 4-6 hours minimum:  Change oxygen saturation probe site  4.  Every 4-6 hours:  If on nasal continuous positive airway pressure, respiratory therapy assess nares and determine need for appliance change or resting period.  Outcome: Progressing     Problem: Risk for Elopement  Goal: Patient will not exit the unit/facility without proper excort  Outcome: Progressing

## 2024-06-14 NOTE — PROGRESS NOTES
Ostomy Referral Follow-up Progress Note      NAME:  Jil Mcneill  MEDICAL RECORD NUMBER:  [id number]  AGE: 68 y.o.   GENDER:  female  :  1955  TODAY'S DATE:  2024    Subjective: The bag did not leak over night but I can feel it is starting too.    Jil Mcneill is a 68 y.o. female referred by:   [] Physician  [] Nursing  [] Other:     Existing ileostomy: Hx of rectosigmoid resection and end ileostomy for Crohns disease 2009.        Stoma size:  28 mm. Red, protrudes, junction intact.  Appliance size:  Covetec Convex one piece esteem body appliance.  Crust with stoma powder then barrier spray.  One paste ring around stoma. One paste strip 1/2 each) in crease toward naval.  Barrier extenders around stoma pouch.      Order # [id number] Talaentiatec Esteem Body CONCEV one piece with drainable bag 10-35mm size 570 ml bag.     Objective  Lying in bed.  Just ate 100% of breakfast tray sitting on bed side table.    BP (!) 150/79   Pulse 90   Temp 97.9 °F (36.6 °C) (Oral)   Resp 18   Ht 1.753 m (5' 9\")   Wt 136.1 kg (300 lb)   SpO2 94%   BMI 44.30 kg/m²     Jose Risk Score Jose Scale Score: 17    Assessment Chaim wound much better with minimal redness now.  See photo.  Stool yellow brown. Stoma red, moist, protrudes.  Junction intact.   Surgeon Unnown      Ileostomy/Jejunostomy LLQ Ileostomy (Active)   Stomal Appliance 1 piece;Convex;Changed 24   Flange Size (inches) 1.75 Inches 24   Stoma  Assessment Red;Moist;Protrudes 24   Peristomal Assessment Red;Other (Comment) 24   Mucocutaneous Junction Intact 24   Treatment Pouch change;Stoma powder;Liquid skin barrier;Barrier ring;Heat applied;Ostomy belt 24   Stool Appearance Loose;Watery 24   Stool Color Yellow;Brown 24   Stool Amount Medium 24   Output (mL) 200 ml 24   Number of days: 5621       Ileostomy:  Prep chaim wound with stoma powder (light  dusting) rub in. Dab or spray barrier film to seal the powder.  If you put too much powder it will inhibit seal.  Light dusting only please.    Prep chaim wound with one paste ring around stoma and 1/2 paste strip x 2 tracked toward naval (see photo below).  Place flange with bag around stoma.  Pattern with supplies. Please DO NOT THROW AWAY PATTERN.  Apply barrier extenders around flange. Very important to have the patient wear a belt.  2 belts provided.  DO NOT THROW AWAY BELTS.  IF soiled, wash out hang to dry so you have 2 belts at all l times.              Intake/Output Summary (Last 24 hours) at 6/14/2024 0936  Last data filed at 6/14/2024 0934  Gross per 24 hour   Intake 230 ml   Output 2300 ml   Net -2070 ml       Plan:   Plan for Ostomy Care:    Text to Royal GRIFFIN Per ostomy care patient is ready for discharge. WAGNER updated and ostomy supplies in room in bag ready to go     Together with patient, removed current leaking pouch. Skin cleansed with warm water.  Pat dry.  Crusted with stoma powder then barrier film spray.  Paste ring applied around stoma.  Paste strip applied to crease to naval.  Convetec Esteem one piece convex 35 mm appliance applied around stoma.  Barrier extenders applied around stoma pouch and toward naval.  Bag closed.       Stoma Care - Existing ileostomy - Patient to empty appliance when 1/3 to 1/2 full with assistance of the staff. Cleanse inside and outside of the drain spout prior to rolling closed. Change appliance every 3-5 days or 1-2 times a week.  Call for problems with seal [phone] or [phone].    Bed side RN updated on status and bag change. Products in room. Instructions with photo's placed in product bag and in room for staff to use during ostomy change.    WAGNER updated for discharge. Bed side RN (Court) and  updated on ostomy status and that instructions and supplies ready to go with patient to SNF.    Ostomy Plan of Care  [x]

## 2024-06-15 LAB
BACTERIA BLD CULT ORG #2: NORMAL
BACTERIA BLD CULT: NORMAL

## 2024-07-11 PROBLEM — N39.0 UTI (URINARY TRACT INFECTION): Status: RESOLVED | Noted: 2024-06-11 | Resolved: 2024-07-11

## 2024-10-09 ENCOUNTER — APPOINTMENT (OUTPATIENT)
Dept: GENERAL RADIOLOGY | Age: 69
End: 2024-10-09
Payer: COMMERCIAL

## 2024-10-09 ENCOUNTER — HOSPITAL ENCOUNTER (INPATIENT)
Age: 69
LOS: 3 days | Discharge: HOME | End: 2024-10-12
Attending: STUDENT IN AN ORGANIZED HEALTH CARE EDUCATION/TRAINING PROGRAM
Payer: COMMERCIAL

## 2024-10-09 ENCOUNTER — APPOINTMENT (OUTPATIENT)
Dept: CT IMAGING | Age: 69
End: 2024-10-09
Payer: COMMERCIAL

## 2024-10-09 DIAGNOSIS — A41.9 SEPTICEMIA (HCC): Primary | ICD-10-CM

## 2024-10-09 DIAGNOSIS — J96.01 ACUTE RESPIRATORY FAILURE WITH HYPOXIA: ICD-10-CM

## 2024-10-09 DIAGNOSIS — J18.9 PNEUMONIA OF RIGHT MIDDLE LOBE DUE TO INFECTIOUS ORGANISM: ICD-10-CM

## 2024-10-09 DIAGNOSIS — R79.89 ELEVATED TROPONIN: ICD-10-CM

## 2024-10-09 DIAGNOSIS — N39.0 URINARY TRACT INFECTION WITHOUT HEMATURIA, SITE UNSPECIFIED: ICD-10-CM

## 2024-10-09 DIAGNOSIS — N17.9 AKI (ACUTE KIDNEY INJURY) (HCC): ICD-10-CM

## 2024-10-09 LAB
ALBUMIN SERPL-MCNC: 3.7 G/DL (ref 3.4–5)
ALBUMIN/GLOB SERPL: 1 {RATIO} (ref 1.1–2.2)
ALP SERPL-CCNC: 74 U/L (ref 40–129)
ALT SERPL-CCNC: 12 U/L (ref 10–40)
ANION GAP SERPL CALCULATED.3IONS-SCNC: 8 MMOL/L (ref 3–16)
AST SERPL-CCNC: 12 U/L (ref 15–37)
BACTERIA URNS QL MICRO: ABNORMAL /HPF
BASE EXCESS BLDV CALC-SCNC: 2.8 MMOL/L (ref -3–3)
BASOPHILS # BLD: 0 K/UL (ref 0–0.2)
BASOPHILS NFR BLD: 0.3 %
BILIRUB SERPL-MCNC: <0.2 MG/DL (ref 0–1)
BILIRUB UR QL STRIP.AUTO: NEGATIVE
BUN SERPL-MCNC: 61 MG/DL (ref 7–20)
CALCIUM SERPL-MCNC: 9.2 MG/DL (ref 8.3–10.6)
CHLORIDE SERPL-SCNC: 98 MMOL/L (ref 99–110)
CLARITY UR: ABNORMAL
CO2 BLDV-SCNC: 30 MMOL/L
CO2 SERPL-SCNC: 31 MMOL/L (ref 21–32)
COHGB MFR BLDV: 3.9 % (ref 0–1.5)
COLOR UR: YELLOW
CREAT SERPL-MCNC: 2.1 MG/DL (ref 0.6–1.2)
DEPRECATED RDW RBC AUTO: 18.8 % (ref 12.4–15.4)
EOSINOPHIL # BLD: 0.3 K/UL (ref 0–0.6)
EOSINOPHIL NFR BLD: 2.6 %
FLUAV RNA RESP QL NAA+PROBE: NOT DETECTED
FLUBV RNA RESP QL NAA+PROBE: NOT DETECTED
GFR SERPLBLD CREATININE-BSD FMLA CKD-EPI: 25 ML/MIN/{1.73_M2}
GLUCOSE SERPL-MCNC: 119 MG/DL (ref 70–99)
GLUCOSE UR STRIP.AUTO-MCNC: NEGATIVE MG/DL
HCO3 BLDV-SCNC: 28.2 MMOL/L (ref 23–29)
HCT VFR BLD AUTO: 29.7 % (ref 36–48)
HGB BLD-MCNC: 9.6 G/DL (ref 12–16)
HGB UR QL STRIP.AUTO: ABNORMAL
KETONES UR STRIP.AUTO-MCNC: NEGATIVE MG/DL
LACTATE BLDV-SCNC: 1.5 MMOL/L (ref 0.4–1.9)
LEUKOCYTE ESTERASE UR QL STRIP.AUTO: ABNORMAL
LIPASE SERPL-CCNC: 8 U/L (ref 13–60)
LYMPHOCYTES # BLD: 3.5 K/UL (ref 1–5.1)
LYMPHOCYTES NFR BLD: 33.4 %
MCH RBC QN AUTO: 31.4 PG (ref 26–34)
MCHC RBC AUTO-ENTMCNC: 32.5 G/DL (ref 31–36)
MCV RBC AUTO: 96.7 FL (ref 80–100)
METHGB MFR BLDV: 0.3 %
MONOCYTES # BLD: 1.1 K/UL (ref 0–1.3)
MONOCYTES NFR BLD: 11.1 %
NEUTROPHILS # BLD: 5.4 K/UL (ref 1.7–7.7)
NEUTROPHILS NFR BLD: 52.6 %
NITRITE UR QL STRIP.AUTO: NEGATIVE
NT-PROBNP SERPL-MCNC: 355 PG/ML (ref 0–124)
O2 THERAPY: ABNORMAL
PCO2 BLDV: 47.3 MMHG (ref 40–50)
PH BLDV: 7.39 [PH] (ref 7.35–7.45)
PH UR STRIP.AUTO: 6 [PH] (ref 5–8)
PLATELET # BLD AUTO: 226 K/UL (ref 135–450)
PMV BLD AUTO: 7.7 FL (ref 5–10.5)
PO2 BLDV: 65.4 MMHG (ref 25–40)
POTASSIUM SERPL-SCNC: 4.2 MMOL/L (ref 3.5–5.1)
PROCALCITONIN SERPL IA-MCNC: 0.23 NG/ML (ref 0–0.15)
PROT SERPL-MCNC: 7.3 G/DL (ref 6.4–8.2)
PROT UR STRIP.AUTO-MCNC: 30 MG/DL
RBC # BLD AUTO: 3.07 M/UL (ref 4–5.2)
RBC #/AREA URNS HPF: ABNORMAL /HPF (ref 0–4)
SAO2 % BLDV: 92 %
SARS-COV-2 RNA RESP QL NAA+PROBE: NOT DETECTED
SODIUM SERPL-SCNC: 137 MMOL/L (ref 136–145)
SP GR UR STRIP.AUTO: 1.02 (ref 1–1.03)
TROPONIN, HIGH SENSITIVITY: 52 NG/L (ref 0–14)
TROPONIN, HIGH SENSITIVITY: 56 NG/L (ref 0–14)
UA COMPLETE W REFLEX CULTURE PNL UR: YES
UA DIPSTICK W REFLEX MICRO PNL UR: YES
URN SPEC COLLECT METH UR: ABNORMAL
UROBILINOGEN UR STRIP-ACNC: 0.2 E.U./DL
WBC # BLD AUTO: 10.3 K/UL (ref 4–11)
WBC #/AREA URNS HPF: >100 /HPF (ref 0–5)

## 2024-10-09 PROCEDURE — 83690 ASSAY OF LIPASE: CPT

## 2024-10-09 PROCEDURE — 6370000000 HC RX 637 (ALT 250 FOR IP): Performed by: INTERNAL MEDICINE

## 2024-10-09 PROCEDURE — 2580000003 HC RX 258: Performed by: INTERNAL MEDICINE

## 2024-10-09 PROCEDURE — 2580000003 HC RX 258: Performed by: STUDENT IN AN ORGANIZED HEALTH CARE EDUCATION/TRAINING PROGRAM

## 2024-10-09 PROCEDURE — 83880 ASSAY OF NATRIURETIC PEPTIDE: CPT

## 2024-10-09 PROCEDURE — 87636 SARSCOV2 & INF A&B AMP PRB: CPT

## 2024-10-09 PROCEDURE — 83605 ASSAY OF LACTIC ACID: CPT

## 2024-10-09 PROCEDURE — 74176 CT ABD & PELVIS W/O CONTRAST: CPT

## 2024-10-09 PROCEDURE — 1200000000 HC SEMI PRIVATE

## 2024-10-09 PROCEDURE — 99285 EMERGENCY DEPT VISIT HI MDM: CPT

## 2024-10-09 PROCEDURE — 71045 X-RAY EXAM CHEST 1 VIEW: CPT

## 2024-10-09 PROCEDURE — 94761 N-INVAS EAR/PLS OXIMETRY MLT: CPT

## 2024-10-09 PROCEDURE — 87186 SC STD MICRODIL/AGAR DIL: CPT

## 2024-10-09 PROCEDURE — 96375 TX/PRO/DX INJ NEW DRUG ADDON: CPT

## 2024-10-09 PROCEDURE — 93005 ELECTROCARDIOGRAM TRACING: CPT | Performed by: PHYSICIAN ASSISTANT

## 2024-10-09 PROCEDURE — 87077 CULTURE AEROBIC IDENTIFY: CPT

## 2024-10-09 PROCEDURE — 2580000003 HC RX 258: Performed by: PHYSICIAN ASSISTANT

## 2024-10-09 PROCEDURE — 6360000002 HC RX W HCPCS: Performed by: INTERNAL MEDICINE

## 2024-10-09 PROCEDURE — 6360000002 HC RX W HCPCS: Performed by: PHYSICIAN ASSISTANT

## 2024-10-09 PROCEDURE — 84484 ASSAY OF TROPONIN QUANT: CPT

## 2024-10-09 PROCEDURE — 51702 INSERT TEMP BLADDER CATH: CPT

## 2024-10-09 PROCEDURE — 85025 COMPLETE CBC W/AUTO DIFF WBC: CPT

## 2024-10-09 PROCEDURE — 80053 COMPREHEN METABOLIC PANEL: CPT

## 2024-10-09 PROCEDURE — 84145 PROCALCITONIN (PCT): CPT

## 2024-10-09 PROCEDURE — 81001 URINALYSIS AUTO W/SCOPE: CPT

## 2024-10-09 PROCEDURE — 96361 HYDRATE IV INFUSION ADD-ON: CPT

## 2024-10-09 PROCEDURE — 96365 THER/PROPH/DIAG IV INF INIT: CPT

## 2024-10-09 PROCEDURE — 82803 BLOOD GASES ANY COMBINATION: CPT

## 2024-10-09 PROCEDURE — 2700000000 HC OXYGEN THERAPY PER DAY

## 2024-10-09 PROCEDURE — 87086 URINE CULTURE/COLONY COUNT: CPT

## 2024-10-09 PROCEDURE — 96360 HYDRATION IV INFUSION INIT: CPT

## 2024-10-09 PROCEDURE — 6370000000 HC RX 637 (ALT 250 FOR IP): Performed by: PHYSICIAN ASSISTANT

## 2024-10-09 RX ORDER — POLYETHYLENE GLYCOL 3350 17 G/17G
17 POWDER, FOR SOLUTION ORAL DAILY PRN
Status: DISCONTINUED | OUTPATIENT
Start: 2024-10-09 | End: 2024-10-12 | Stop reason: HOSPADM

## 2024-10-09 RX ORDER — 0.9 % SODIUM CHLORIDE 0.9 %
1000 INTRAVENOUS SOLUTION INTRAVENOUS ONCE
Status: COMPLETED | OUTPATIENT
Start: 2024-10-09 | End: 2024-10-09

## 2024-10-09 RX ORDER — SODIUM CHLORIDE 0.9 % (FLUSH) 0.9 %
5-40 SYRINGE (ML) INJECTION PRN
Status: DISCONTINUED | OUTPATIENT
Start: 2024-10-09 | End: 2024-10-12 | Stop reason: HOSPADM

## 2024-10-09 RX ORDER — HYDROCORTISONE 10 MG/1
10 TABLET ORAL DAILY
Status: DISCONTINUED | OUTPATIENT
Start: 2024-10-09 | End: 2024-10-12 | Stop reason: HOSPADM

## 2024-10-09 RX ORDER — FLUDROCORTISONE ACETATE 0.1 MG/1
100 TABLET ORAL NIGHTLY
Status: DISCONTINUED | OUTPATIENT
Start: 2024-10-09 | End: 2024-10-12 | Stop reason: HOSPADM

## 2024-10-09 RX ORDER — LEVOTHYROXINE SODIUM 150 UG/1
150 TABLET ORAL
Status: DISCONTINUED | OUTPATIENT
Start: 2024-10-10 | End: 2024-10-12 | Stop reason: HOSPADM

## 2024-10-09 RX ORDER — ONDANSETRON 2 MG/ML
4 INJECTION INTRAMUSCULAR; INTRAVENOUS EVERY 6 HOURS PRN
Status: DISCONTINUED | OUTPATIENT
Start: 2024-10-09 | End: 2024-10-11

## 2024-10-09 RX ORDER — SODIUM CHLORIDE 9 MG/ML
INJECTION, SOLUTION INTRAVENOUS PRN
Status: DISCONTINUED | OUTPATIENT
Start: 2024-10-09 | End: 2024-10-12 | Stop reason: HOSPADM

## 2024-10-09 RX ORDER — ONDANSETRON 4 MG/1
4 TABLET, ORALLY DISINTEGRATING ORAL EVERY 8 HOURS PRN
Status: DISCONTINUED | OUTPATIENT
Start: 2024-10-09 | End: 2024-10-11

## 2024-10-09 RX ORDER — SODIUM CHLORIDE 0.9 % (FLUSH) 0.9 %
5-40 SYRINGE (ML) INJECTION EVERY 12 HOURS SCHEDULED
Status: DISCONTINUED | OUTPATIENT
Start: 2024-10-09 | End: 2024-10-12 | Stop reason: HOSPADM

## 2024-10-09 RX ORDER — GABAPENTIN 300 MG/1
300 CAPSULE ORAL 3 TIMES DAILY
Status: DISCONTINUED | OUTPATIENT
Start: 2024-10-09 | End: 2024-10-12 | Stop reason: HOSPADM

## 2024-10-09 RX ORDER — ALBUTEROL SULFATE 90 UG/1
2 INHALANT RESPIRATORY (INHALATION) EVERY 6 HOURS PRN
Status: DISCONTINUED | OUTPATIENT
Start: 2024-10-09 | End: 2024-10-10

## 2024-10-09 RX ORDER — 0.9 % SODIUM CHLORIDE 0.9 %
500 INTRAVENOUS SOLUTION INTRAVENOUS ONCE
Status: COMPLETED | OUTPATIENT
Start: 2024-10-09 | End: 2024-10-09

## 2024-10-09 RX ORDER — METOPROLOL TARTRATE 25 MG/1
25 TABLET, FILM COATED ORAL 2 TIMES DAILY
Status: DISCONTINUED | OUTPATIENT
Start: 2024-10-09 | End: 2024-10-12 | Stop reason: HOSPADM

## 2024-10-09 RX ORDER — DIVALPROEX SODIUM 250 MG/1
500 TABLET, FILM COATED, EXTENDED RELEASE ORAL 2 TIMES DAILY
Status: DISCONTINUED | OUTPATIENT
Start: 2024-10-09 | End: 2024-10-12 | Stop reason: HOSPADM

## 2024-10-09 RX ORDER — CLONAZEPAM 0.5 MG/1
0.5 TABLET ORAL 3 TIMES DAILY PRN
Status: DISCONTINUED | OUTPATIENT
Start: 2024-10-09 | End: 2024-10-12 | Stop reason: HOSPADM

## 2024-10-09 RX ORDER — QUETIAPINE FUMARATE 100 MG/1
100 TABLET, FILM COATED ORAL DAILY
Status: DISCONTINUED | OUTPATIENT
Start: 2024-10-09 | End: 2024-10-12 | Stop reason: HOSPADM

## 2024-10-09 RX ORDER — FERROUS SULFATE 325(65) MG
325 TABLET ORAL
Status: DISCONTINUED | OUTPATIENT
Start: 2024-10-09 | End: 2024-10-12 | Stop reason: HOSPADM

## 2024-10-09 RX ORDER — MONTELUKAST SODIUM 10 MG/1
10 TABLET ORAL NIGHTLY
Status: DISCONTINUED | OUTPATIENT
Start: 2024-10-09 | End: 2024-10-12 | Stop reason: HOSPADM

## 2024-10-09 RX ORDER — ENOXAPARIN SODIUM 100 MG/ML
30 INJECTION SUBCUTANEOUS 2 TIMES DAILY
Status: DISCONTINUED | OUTPATIENT
Start: 2024-10-10 | End: 2024-10-12 | Stop reason: HOSPADM

## 2024-10-09 RX ORDER — ACETAMINOPHEN 500 MG
1000 TABLET ORAL ONCE
Status: COMPLETED | OUTPATIENT
Start: 2024-10-09 | End: 2024-10-09

## 2024-10-09 RX ORDER — QUETIAPINE FUMARATE 100 MG/1
200 TABLET, FILM COATED ORAL NIGHTLY
Status: DISCONTINUED | OUTPATIENT
Start: 2024-10-09 | End: 2024-10-12 | Stop reason: HOSPADM

## 2024-10-09 RX ORDER — NIFEDIPINE 30 MG/1
30 TABLET, EXTENDED RELEASE ORAL DAILY
Status: DISCONTINUED | OUTPATIENT
Start: 2024-10-09 | End: 2024-10-12 | Stop reason: HOSPADM

## 2024-10-09 RX ADMIN — MONTELUKAST 10 MG: 10 TABLET, FILM COATED ORAL at 21:55

## 2024-10-09 RX ADMIN — SODIUM CHLORIDE 1000 ML: 9 INJECTION, SOLUTION INTRAVENOUS at 13:21

## 2024-10-09 RX ADMIN — SODIUM CHLORIDE 500 ML: 9 INJECTION, SOLUTION INTRAVENOUS at 16:32

## 2024-10-09 RX ADMIN — FLUDROCORTISONE ACETATE 100 MCG: 0.1 TABLET ORAL at 21:56

## 2024-10-09 RX ADMIN — SODIUM CHLORIDE 500 ML: 9 INJECTION, SOLUTION INTRAVENOUS at 15:03

## 2024-10-09 RX ADMIN — METOPROLOL TARTRATE 25 MG: 25 TABLET, FILM COATED ORAL at 21:56

## 2024-10-09 RX ADMIN — SODIUM CHLORIDE: 9 INJECTION, SOLUTION INTRAVENOUS at 21:54

## 2024-10-09 RX ADMIN — AMPICILLIN SODIUM AND SULBACTAM SODIUM 3000 MG: 2; 1 INJECTION, POWDER, FOR SOLUTION INTRAMUSCULAR; INTRAVENOUS at 14:44

## 2024-10-09 RX ADMIN — ACETAMINOPHEN 1000 MG: 500 TABLET ORAL at 13:22

## 2024-10-09 RX ADMIN — QUETIAPINE FUMARATE 200 MG: 100 TABLET ORAL at 21:56

## 2024-10-09 RX ADMIN — DIVALPROEX SODIUM 500 MG: 250 TABLET, EXTENDED RELEASE ORAL at 21:55

## 2024-10-09 RX ADMIN — GABAPENTIN 300 MG: 300 CAPSULE ORAL at 21:56

## 2024-10-09 RX ADMIN — HYDROMORPHONE HYDROCHLORIDE 0.5 MG: 1 INJECTION, SOLUTION INTRAMUSCULAR; INTRAVENOUS; SUBCUTANEOUS at 13:44

## 2024-10-09 RX ADMIN — PIPERACILLIN AND TAZOBACTAM 3375 MG: 3; .375 INJECTION, POWDER, LYOPHILIZED, FOR SOLUTION INTRAVENOUS at 21:55

## 2024-10-09 ASSESSMENT — PAIN DESCRIPTION - LOCATION
LOCATION: LEG
LOCATION: ABDOMEN;LEG
LOCATION: LEG

## 2024-10-09 ASSESSMENT — PAIN SCALES - GENERAL
PAINLEVEL_OUTOF10: 0
PAINLEVEL_OUTOF10: 7
PAINLEVEL_OUTOF10: 7
PAINLEVEL_OUTOF10: 10

## 2024-10-09 ASSESSMENT — PAIN - FUNCTIONAL ASSESSMENT: PAIN_FUNCTIONAL_ASSESSMENT: 0-10

## 2024-10-09 NOTE — CONSULTS
10/09/24 1618   Ileostomy/Jejunostomy LLQ Ileostomy   Placement Date/Time: 01/23/09 0400   Present on Admission/Arrival: Yes  Location: LLQ  Ostomy Type: Ileostomy   Stomal Appliance 2 piece;Flat;Changed   Flange Size (inches) 2.25 Inches   Stoma  Assessment Protrudes;Moist;Red   Peristomal Assessment Denuded   Mucocutaneous Junction Intact   Treatment Pouch change;Site care;Stoma powder;Heat applied   Stool Appearance Loose   Stool Color Brown   Stool Amount Small   Output (mL) 150 ml     Wound Care requested to come to ED to evaluate ostomy appliance for infection by Dr. Mandel.  Once there patient was taken for a CT scan.   Wound Care prepared for what she would need on patient's return.    Wound Care does not feel ostomy site if infected.  Stoma actually appears well and intact.    Ileostomy created 01/23/2009 by Dr. Wells with [facility].   Due to Crohn's disease.    When patient returned to room Wound Care noted extra extensions to ostomy appliance. Appliance was dislodged and coming off.   Once removed, Wound Care provided skin care to abraded skin to mid side of lower left quadrant ostomy of   Barrier powder then dapped with barrier film to form crust.       Measured stoma  25 mm  Placed Coloplaste flat red wafer 01800 then high out put pouch 92734   Applied protective sheet cut to fit lower edge of wafer;   Use barrier extenders.  If desired can use barrier ring under wafer next change if feel wafer does not have tight seal.  Can replace melanie out pouch with Red Coloplaste roll & drain Pouch 75436 if feel melanie out put pulls to much.

## 2024-10-09 NOTE — ED PROVIDER NOTES
[facility]  ED  EMERGENCY DEPARTMENT ENCOUNTER        Pt Name: Jil Mcneill  MRN: [id number]  Birthdate 1955  Date of evaluation: 10/9/2024  Provider: LUNA PEARL PA-C  PCP: Ceasar Araujo MD  ED Attending: Kristin Mandel MD       I have seen and evaluated this patient with my supervising physician Kristin Mandel MD.    CHIEF COMPLAINT:     Chief Complaint   Patient presents with    Shortness of Breath     Pt from [facility] via [facility]. Pt had a cough over the weekend A/O x4 . Today couldn't open eyes. Pt 84% en route EMS gave 125 Solumedrol. LKW 0930       HISTORY OF PRESENT ILLNESS:      History provided by the patient. No limitations.    Jil Mcneill is a 69 y.o. female who arrives to the ED by EMS from the [facility] where the patient is a resident.  Her CODE STATUS is DNR CC.  She was sent here to be evaluated for a cough that developed over the weekend and today some lethargy.  She was reported to be satting at 84% on room air by EMS and is now on nasal cannula oxygen satting in the 90s.  EMS gave her Solu-Medrol 125 mg IV and route.  The patient denies chest pain, admits to some dyspnea.  She also complains of abdominal pain and has a colostomy bag to the left lower quadrant that is poorly kept.    Nursing Notes were reviewed     REVIEW OF SYSTEMS:     Review of Systems  Positives and pertinent negatives as per HPI.      PAST MEDICAL HISTORY:     Past Medical History:   Diagnosis Date    Anemia     Chronic pain     pain mgmt and psych    COPD (chronic obstructive pulmonary disease) (HCC)     Crohn's disease (HCC)     Depression     Morbid obesity     Orthostatic hypotension     Renal insufficiency        SURGICAL HISTORY:      Past Surgical History:   Procedure Laterality Date    HIP FRACTURE SURGERY      ILEOSTOMY OR JEJUNOSTOMY         CURRENT MEDICATIONS:       Previous Medications    ALBUTEROL (PROVENTIL HFA) 108 (90 BASE) MCG/ACT INHALER    Inhale 2 puffs  into the lungs every 6 hours as needed for Wheezing.    CALCIUM CARBONATE (TUMS) 500 MG CHEWABLE TABLET    Take 2 tablets by mouth every 6 hours as needed for Heartburn    CLONAZEPAM (KLONOPIN) 0.5 MG TABLET    Take 1 tablet by mouth 3 times daily as needed for Anxiety.    DIVALPROEX (DEPAKOTE ER) 500 MG EXTENDED RELEASE TABLET    Take 1 tablet by mouth 2 times daily    DULOXETINE (CYMBALTA) 30 MG EXTENDED RELEASE CAPSULE    Take 3 capsules by mouth daily    FAMOTIDINE (PEPCID) 20 MG TABLET    Take 1 tablet by mouth 2 times daily as needed    FERROUS SULFATE (IRON 325) 325 (65 FE) MG TABLET    Take 1 tablet by mouth Every Monday, Wednesday, and Friday    FLUDROCORTISONE (FLORINEF) 0.1 MG TABLET    Take 1 tablet by mouth nightly    FOLIC ACID (FOLVITE) 1 MG TABLET    Take 1 tablet by mouth daily    FUROSEMIDE (LASIX) 20 MG TABLET    Take 1 tablet by mouth daily as needed    GABAPENTIN (NEURONTIN) 600 MG TABLET    Take 0.5 tablets by mouth 3 times daily for 120 days.    HYDROCORTISONE (CORTEF) 10 MG TABLET    Take one tablet by mouth every morning    KETOTIFEN FUMARATE (ZADITOR) 0.035 % OPHTHALMIC SOLUTION    1 drop 2 times daily    LEVOTHYROXINE (SYNTHROID) 150 MCG TABLET    Take 1 tablet by mouth every morning (before breakfast)    LORATADINE (CLARITIN) 10 MG TABLET    Take 1 tablet by mouth daily    METOPROLOL TARTRATE (LOPRESSOR) 25 MG TABLET    Take 1 tablet by mouth 2 times daily    MOMETASONE-FORMOTEROL (DULERA) 100-5 MCG/ACT INHALER    Inhale 2 puffs into the lungs 2 times daily    MONTELUKAST (SINGULAIR) 10 MG TABLET    Take 1 tablet by mouth nightly.    NIFEDIPINE (ADALAT CC) 30 MG EXTENDED RELEASE TABLET    Take 1 tablet by mouth daily    POLYCARBOPHIL (FIBERCON) 625 MG TABLET    Take 1 tablet by mouth 2 times daily for loose stool    QUETIAPINE (SEROQUEL) 100 MG TABLET    Take 1 tablet by mouth daily    QUETIAPINE (SEROQUEL) 200 MG TABLET    Take 1 tablet by mouth nightly    TRAZODONE (DESYREL) 50 MG  the lumbar spine.  Partially visualized right hip arthroplasty hardware.     1. No acute intra-abdominal or pelvic pathology identified. 2. Left lower quadrant ileostomy. No evidence of bowel obstruction. 3. Subcutaneous fat stranding within the inferior left ventral abdomen with mild overlying skin thickening, which is nonspecific, but cellulitis is a possibility. 4. Mild dependent consolidation in the bilateral lower lobes, which may represent atelectasis or pneumonia.     XR CHEST PORTABLE    Result Date: 10/9/2024  EXAMINATION: ONE XRAY VIEW OF THE CHEST 10/9/2024 12:52 pm COMPARISON: 06/12/2024 HISTORY: ORDERING SYSTEM PROVIDED HISTORY: SOB, hypoxia, fever TECHNOLOGIST PROVIDED HISTORY: Reason for exam:->SOB, hypoxia, fever Reason for Exam: sob, hypoxia FINDINGS: There is some chronic patchy airspace disease noted in the left lung.  There is evidence of some mild new airspace disease noted in the the right lung. This may represent acute pneumonia.  The heart and mediastinal structures appear normal.  Bony structures appear normal.  Visualized upper abdomen appears normal.     New mild infiltration noted in the the right mid lung zone, suspicious for pneumonia.  Chronic left lung airspace disease.        EKG:    The Ekg interpreted by me in the absence of a cardiologist shows.    sinus tachycardia, wpbn=[id number]    No evidence of acute ischemia.     See also interpretation by ED attending physician, MD Ministerio      PROCEDURES:   N/A      CRITICAL CARE TIME:     Due to the immediate potential for life-threatening deterioration due to sepsis with findings of pneumonia and UTI, I spent 34 minutes providing critical care.  This time is excluding time spent performing procedures.        EMERGENCY DEPARTMENT COURSE and DIFFERENTIAL DIAGNOSIS/MDM:   Vitals:    Vitals:    10/09/24 1556 10/09/24 1633 10/09/24 1708 10/09/24 1741   BP: (!) 105/55 (!) 95/45 (!) 98/50 (!) 100/53   Pulse: 100 95 95 95   Resp: 20 17 20 21   Temp:

## 2024-10-09 NOTE — ED NOTES
Jil Mcneill is a 69 y.o. female admitted for  Principal Problem:    Acute pneumonia  Resolved Problems:    * No resolved hospital problems. *  .   Patient SNF LTC via EMS transportation with   Chief Complaint   Patient presents with    Shortness of Breath     Pt from [facility] via [facility]. Pt had a cough over the weekend A/O x4 . Today couldn't open eyes. Pt 84% en route EMS gave 125 Solumedrol. LKW 0930   .  Patient is alert and Person, Place, Time, and Situation  Patient's baseline mobility: Baseline Mobility: Bed bound   Code Status: Prior   Cardiac Rhythm:    O2 Flow Rate (L/min): 4 L/min  Is patient on baseline Oxygen: yes, but not normally, how many Liters4:   Abnormal Assessment Findings: Colostomy    Isolation: None      NIH Score:    C-SSRS: Risk of Suicide: No Risk  Bedside swallow:        Active LDA's:   Peripheral IV 10/09/24 Left Antecubital (Active)       Peripheral IV 10/09/24 Right Forearm (Active)   Site Assessment Clean, dry & intact 10/09/24 1502   Line Status Brisk blood return;Flushed 10/09/24 1502   Line Care Connections checked and tightened 10/09/24 1502   Phlebitis Assessment No symptoms 10/09/24 1502   Infiltration Assessment 0 10/09/24 1502   Dressing Status Clean, dry & intact 10/09/24 1502   Dressing Type Transparent 10/09/24 1502   Dressing Intervention New 10/09/24 1502     Patient admitted with a alaniz: yes, temp sense. If the alaniz is chronic was it exchanged:NA  Reason for alaniz: Strict I&O  Patient admitted with Central Line:  NA . PICC line placement confirmed: YES OR NO:[id number]}   Reason for Central line:   Was central line Inserted from an outside facility:        Family/Caregiver Present no Any Concerns: no   Restraints no  Sitter no         Vitals: MEWS Score: 3    Vitals:    10/09/24 1633 10/09/24 1708 10/09/24 1741 10/09/24 1821   BP: (!) 95/45 (!) 98/50 (!) 100/53 (!) 125/98   Pulse: 95 95 95 98   Resp: 17 20 21 21   Temp:       TempSrc:       SpO2: 93% 93%  Abnormal; Notable for the following components:    WBC, UA >100 (*)     Bacteria, UA 1+ (*)     All other components within normal limits     Critical values: no  Intervention for critical value(s):     Abnormal Imaging: yes, pneumonia xRay and CT scan            You may also review the ED PT Care Timeline found under the Summary Tab, ED Encounter Summary, Timeline Reports, ED Patient Care Timeline.     Recommendation    Pending orders/Uncompleted orders to hand off:  Admission    Additional Comments: Wound care already consulted and changed ostomy stuff.     If any further questions, please call Sending RN at [phone]

## 2024-10-09 NOTE — CONSULTS
10/09/24 1644   Skin Integumentary    Skin Integumentary (WDL) X   Skin Color Hyperpigmentation;Red   Skin Condition/Temp Warm   Skin Integrity Redness   Location left groin   Skin Fold Management Yes   Dressing Site Groin   Treatment Silver impregnated textile   Date Applied 10/09/24   Assessed This Shift Red;Moist       Recommendations.  Cleanse groin left with warm wash cloth daily.    Apply Inter dry flat edge into crease r/t moisture & irritation.

## 2024-10-10 LAB
ANION GAP SERPL CALCULATED.3IONS-SCNC: 12 MMOL/L (ref 3–16)
BASOPHILS # BLD: 0 K/UL (ref 0–0.2)
BASOPHILS NFR BLD: 0.3 %
BUN SERPL-MCNC: 52 MG/DL (ref 7–20)
CALCIUM SERPL-MCNC: 8.9 MG/DL (ref 8.3–10.6)
CHLORIDE SERPL-SCNC: 101 MMOL/L (ref 99–110)
CO2 SERPL-SCNC: 26 MMOL/L (ref 21–32)
CREAT SERPL-MCNC: 2.2 MG/DL (ref 0.6–1.2)
DEPRECATED RDW RBC AUTO: 18.4 % (ref 12.4–15.4)
EKG ATRIAL RATE: 119 BPM
EKG DIAGNOSIS: NORMAL
EKG P AXIS: 56 DEGREES
EKG P-R INTERVAL: 146 MS
EKG Q-T INTERVAL: 324 MS
EKG QRS DURATION: 82 MS
EKG QTC CALCULATION (BAZETT): 455 MS
EKG R AXIS: 17 DEGREES
EKG T AXIS: 67 DEGREES
EKG VENTRICULAR RATE: 119 BPM
EOSINOPHIL # BLD: 0 K/UL (ref 0–0.6)
EOSINOPHIL NFR BLD: 0.2 %
GFR SERPLBLD CREATININE-BSD FMLA CKD-EPI: 23 ML/MIN/{1.73_M2}
GLUCOSE SERPL-MCNC: 109 MG/DL (ref 70–99)
HCT VFR BLD AUTO: 27.9 % (ref 36–48)
HGB BLD-MCNC: 9 G/DL (ref 12–16)
LYMPHOCYTES # BLD: 1.5 K/UL (ref 1–5.1)
LYMPHOCYTES NFR BLD: 20.8 %
MCH RBC QN AUTO: 30.9 PG (ref 26–34)
MCHC RBC AUTO-ENTMCNC: 32.3 G/DL (ref 31–36)
MCV RBC AUTO: 95.7 FL (ref 80–100)
MONOCYTES # BLD: 0.6 K/UL (ref 0–1.3)
MONOCYTES NFR BLD: 8.9 %
NEUTROPHILS # BLD: 4.9 K/UL (ref 1.7–7.7)
NEUTROPHILS NFR BLD: 69.8 %
PLATELET # BLD AUTO: 195 K/UL (ref 135–450)
PMV BLD AUTO: 7.4 FL (ref 5–10.5)
POTASSIUM SERPL-SCNC: 4.7 MMOL/L (ref 3.5–5.1)
RBC # BLD AUTO: 2.91 M/UL (ref 4–5.2)
SODIUM SERPL-SCNC: 139 MMOL/L (ref 136–145)
WBC # BLD AUTO: 7 K/UL (ref 4–11)

## 2024-10-10 PROCEDURE — 2500000003 HC RX 250 WO HCPCS: Performed by: NURSE PRACTITIONER

## 2024-10-10 PROCEDURE — 6370000000 HC RX 637 (ALT 250 FOR IP): Performed by: INTERNAL MEDICINE

## 2024-10-10 PROCEDURE — 2580000003 HC RX 258: Performed by: INTERNAL MEDICINE

## 2024-10-10 PROCEDURE — 6360000002 HC RX W HCPCS: Performed by: INTERNAL MEDICINE

## 2024-10-10 PROCEDURE — 80048 BASIC METABOLIC PNL TOTAL CA: CPT

## 2024-10-10 PROCEDURE — 94669 MECHANICAL CHEST WALL OSCILL: CPT

## 2024-10-10 PROCEDURE — 51702 INSERT TEMP BLADDER CATH: CPT

## 2024-10-10 PROCEDURE — 2700000000 HC OXYGEN THERAPY PER DAY

## 2024-10-10 PROCEDURE — 6370000000 HC RX 637 (ALT 250 FOR IP): Performed by: NURSE PRACTITIONER

## 2024-10-10 PROCEDURE — 94761 N-INVAS EAR/PLS OXIMETRY MLT: CPT

## 2024-10-10 PROCEDURE — 36415 COLL VENOUS BLD VENIPUNCTURE: CPT

## 2024-10-10 PROCEDURE — 85025 COMPLETE CBC W/AUTO DIFF WBC: CPT

## 2024-10-10 PROCEDURE — 94640 AIRWAY INHALATION TREATMENT: CPT

## 2024-10-10 PROCEDURE — 93010 ELECTROCARDIOGRAM REPORT: CPT | Performed by: INTERNAL MEDICINE

## 2024-10-10 PROCEDURE — 1200000000 HC SEMI PRIVATE

## 2024-10-10 RX ORDER — OXYCODONE AND ACETAMINOPHEN 5; 325 MG/1; MG/1
1 TABLET ORAL EVERY 6 HOURS PRN
Status: DISCONTINUED | OUTPATIENT
Start: 2024-10-10 | End: 2024-10-11

## 2024-10-10 RX ORDER — ALBUTEROL SULFATE 90 UG/1
2 INHALANT RESPIRATORY (INHALATION)
Status: DISCONTINUED | OUTPATIENT
Start: 2024-10-10 | End: 2024-10-12 | Stop reason: HOSPADM

## 2024-10-10 RX ADMIN — CLONAZEPAM 0.5 MG: 0.5 TABLET ORAL at 22:53

## 2024-10-10 RX ADMIN — ENOXAPARIN SODIUM 30 MG: 100 INJECTION SUBCUTANEOUS at 07:38

## 2024-10-10 RX ADMIN — MICONAZOLE NITRATE: 2 POWDER TOPICAL at 23:15

## 2024-10-10 RX ADMIN — Medication 2 PUFF: at 05:38

## 2024-10-10 RX ADMIN — QUETIAPINE FUMARATE 200 MG: 100 TABLET ORAL at 21:48

## 2024-10-10 RX ADMIN — NIFEDIPINE 30 MG: 30 TABLET, FILM COATED, EXTENDED RELEASE ORAL at 07:39

## 2024-10-10 RX ADMIN — Medication 2 PUFF: at 19:17

## 2024-10-10 RX ADMIN — SODIUM CHLORIDE, PRESERVATIVE FREE 10 ML: 5 INJECTION INTRAVENOUS at 21:50

## 2024-10-10 RX ADMIN — DULOXETINE HYDROCHLORIDE 90 MG: 30 CAPSULE, DELAYED RELEASE ORAL at 07:39

## 2024-10-10 RX ADMIN — DIVALPROEX SODIUM 500 MG: 250 TABLET, EXTENDED RELEASE ORAL at 07:39

## 2024-10-10 RX ADMIN — QUETIAPINE FUMARATE 100 MG: 100 TABLET ORAL at 07:39

## 2024-10-10 RX ADMIN — LEVOTHYROXINE SODIUM 150 MCG: 0.15 TABLET ORAL at 08:29

## 2024-10-10 RX ADMIN — GABAPENTIN 300 MG: 300 CAPSULE ORAL at 07:39

## 2024-10-10 RX ADMIN — MONTELUKAST 10 MG: 10 TABLET, FILM COATED ORAL at 21:48

## 2024-10-10 RX ADMIN — OXYCODONE HYDROCHLORIDE AND ACETAMINOPHEN 1 TABLET: 5; 325 TABLET ORAL at 22:53

## 2024-10-10 RX ADMIN — Medication 2 PUFF: at 07:35

## 2024-10-10 RX ADMIN — FLUDROCORTISONE ACETATE 100 MCG: 0.1 TABLET ORAL at 21:49

## 2024-10-10 RX ADMIN — PIPERACILLIN AND TAZOBACTAM 3375 MG: 3; .375 INJECTION, POWDER, LYOPHILIZED, FOR SOLUTION INTRAVENOUS at 05:03

## 2024-10-10 RX ADMIN — DIVALPROEX SODIUM 500 MG: 250 TABLET, EXTENDED RELEASE ORAL at 21:48

## 2024-10-10 RX ADMIN — GABAPENTIN 300 MG: 300 CAPSULE ORAL at 21:48

## 2024-10-10 RX ADMIN — CLONAZEPAM 0.5 MG: 0.5 TABLET ORAL at 15:01

## 2024-10-10 RX ADMIN — SODIUM CHLORIDE: 9 INJECTION, SOLUTION INTRAVENOUS at 11:28

## 2024-10-10 RX ADMIN — PIPERACILLIN AND TAZOBACTAM 4500 MG: 4; .5 INJECTION, POWDER, LYOPHILIZED, FOR SOLUTION INTRAVENOUS at 22:03

## 2024-10-10 RX ADMIN — METOPROLOL TARTRATE 25 MG: 25 TABLET, FILM COATED ORAL at 21:48

## 2024-10-10 RX ADMIN — SODIUM CHLORIDE: 9 INJECTION, SOLUTION INTRAVENOUS at 22:00

## 2024-10-10 RX ADMIN — ENOXAPARIN SODIUM 30 MG: 100 INJECTION SUBCUTANEOUS at 21:47

## 2024-10-10 RX ADMIN — CLONAZEPAM 0.5 MG: 0.5 TABLET ORAL at 07:39

## 2024-10-10 RX ADMIN — HYDROCORTISONE 10 MG: 10 TABLET ORAL at 08:29

## 2024-10-10 RX ADMIN — METOPROLOL TARTRATE 25 MG: 25 TABLET, FILM COATED ORAL at 08:17

## 2024-10-10 RX ADMIN — PIPERACILLIN AND TAZOBACTAM 4500 MG: 4; .5 INJECTION, POWDER, LYOPHILIZED, FOR SOLUTION INTRAVENOUS at 11:28

## 2024-10-10 RX ADMIN — GABAPENTIN 300 MG: 300 CAPSULE ORAL at 14:58

## 2024-10-10 RX ADMIN — CLONAZEPAM 0.5 MG: 0.5 TABLET ORAL at 00:33

## 2024-10-10 ASSESSMENT — PAIN DESCRIPTION - ONSET: ONSET: ON-GOING

## 2024-10-10 ASSESSMENT — PAIN DESCRIPTION - LOCATION
LOCATION: BACK;GENERALIZED
LOCATION: BACK;GENERALIZED

## 2024-10-10 ASSESSMENT — PAIN DESCRIPTION - DESCRIPTORS
DESCRIPTORS: ACHING;PRESSURE
DESCRIPTORS: ACHING;PRESSURE;SHARP

## 2024-10-10 ASSESSMENT — PAIN SCALES - GENERAL
PAINLEVEL_OUTOF10: 9
PAINLEVEL_OUTOF10: 0
PAINLEVEL_OUTOF10: 10
PAINLEVEL_OUTOF10: 6

## 2024-10-10 ASSESSMENT — PAIN DESCRIPTION - FREQUENCY: FREQUENCY: CONTINUOUS

## 2024-10-10 ASSESSMENT — PAIN DESCRIPTION - ORIENTATION
ORIENTATION: LEFT;RIGHT;MID
ORIENTATION: MID;RIGHT;LEFT;LOWER

## 2024-10-10 ASSESSMENT — PAIN DESCRIPTION - PAIN TYPE: TYPE: ACUTE PAIN

## 2024-10-10 NOTE — CARE COORDINATION
Writer made aware by Vinicio at [facility] that there are behavior concerns r/t the patient, including being non-compliant w/ostomy care, personal care and attending doc apmts. [facility] feels they no longer can provide services needed for pt d/t behaviors. Although pt is a Medicaid bed hold and by law pt can return.    Writer spoke w/Nephew r/t this information. He reprots he wants pt to return to [facility] while he looks over other facilities. States the stay at [facility]  will be temporary until he finds alternative placement.    Electronically signed by RONNA JONES RN on 10/10/2024 at 1:11 PM

## 2024-10-10 NOTE — H&P
Hospital Medicine History & Physical      Date of Admission: 10/9/2024    Date of Service:  Pt seen/examined on 10/09/24     [x]Admitted to Inpatient with expected LOS greater than two midnights due to medical therapy.  []Placed in Observation status.    Chief Admission Complaint:  cough    Presenting Admission History:      69 y.o. female who presented to [facility] with cough.  PMHx significant for COPD, CKD, adrenal insufficiency, hypothyroidism, anemia, depression. Patient is at a long term care facility. She started having increasing cough several days ago. Cough has been non-productive. She has also noticed increased fatigue. She is wheelchair dependent at baseline but has been having more difficulty with transfers lately. Patient was admitted to the hospital a few months ago due to pneumonia an UTI.     Assessment/Plan:      Current Principal Problem:  Acute pneumonia    Pneumonia  - no evidence of sepsis  - suspect gram positive  - stated on zosyn    UTI  - noted on UA  - urine culture pending  - started on zosyn    COPD  - no evidence of exacerbation  - continue inhalers    Adrenal insufficiency  - continue cortef, florinef    CKD IV  - Cr at baseline  - continue to monitor    Hypothyroidism  - continue synthroid    Depression  - mood stable  - continue cymbalta, depakote, seroquel    Anemia  - chronic, stable    Discussed management and the need for Hospitalization of the patient w/ the Emergency Department Provider: Dr. Mandel    CXR: I have reviewed the CXR with the following interpretation: right middle lobe consolidation  EKG:  I have reviewed the EKG with the following interpretation: sinus tachycardia    Physical Exam Performed:      /70   Pulse 98   Temp 98.3 °F (36.8 °C) (Oral)   Resp 18   Ht 1.753 m (5' 9\")   Wt 136.1 kg (300 lb)   SpO2 92%   BMI 44.30 kg/m²     General appearance:  No apparent distress, appears stated age and cooperative.  HEENT:  Pupils equal, round, and  the abdomen and pelvis was performed without the administration of intravenous contrast. Multiplanar reformatted images are provided for review. Automated exposure control, iterative reconstruction, and/or weight based adjustment of the mA/kV was utilized to reduce the radiation dose to as low as reasonably achievable. COMPARISON: CT chest abdomen pelvis 06/11/2024 HISTORY: ORDERING SYSTEM PROVIDED HISTORY: abd pain, colostomy TECHNOLOGIST PROVIDED HISTORY: Reason for exam:->abd pain, colostomy Additional Contrast?->None Decision Support Exception - unselect if not a suspected or confirmed emergency medical condition->Emergency Medical Condition (MA) Reason for Exam: abd pain, pt poor historian FINDINGS: Lower Chest: Mild dependent consolidation in the bilateral lower lobes. Organs: Liver is normal in contour.  Prior cholecystectomy.  No biliary ductal dilatation. The spleen, pancreas and adrenal glands are unremarkable. Kidneys are normal in contour.  No hydronephrosis or hydroureter. GI/Bowel: The stomach is unremarkable.  There is a left lower quadrant ileostomy.  Rectosigmoid anastomosis is present.  The small and large bowel are normal in caliber.  No evidence of bowel obstruction.  Small to moderate amount of stool throughout the colon and rectum. Pelvis: Partially obscured by streak artifact related to right hip arthroplasty hardware.  Severino catheter within decompressed urinary bladder. No pelvic mass is seen. Peritoneum/Retroperitoneum: No free intraperitoneal air or free fluid.  No evidence of abdominal or pelvic lymphadenopathy. Bones/Soft Tissues: Left lower quadrant ostomy.  There is subcutaneous fat stranding within the inferior left ventral abdomen with mild overlying skin thickening.  No acute osseous abnormality.  Degenerative changes of the lumbar spine.  Partially visualized right hip arthroplasty hardware.     1. No acute intra-abdominal or pelvic pathology identified. 2. Left lower quadrant  tablets by mouth 3 times daily for 120 days. 6/14/24 10/12/24  Marietta Dennison APRN - CNP   levothyroxine (SYNTHROID) 150 MCG tablet Take 1 tablet by mouth every morning (before breakfast) 6/15/24   Marietta Dennison APRN - CNP   traZODone (DESYREL) 50 MG tablet Take 0.5 tablets by mouth nightly for 4 days 6/14/24 6/18/24  Marietta Dennison APRN - CNP   calcium carbonate (TUMS) 500 MG chewable tablet Take 2 tablets by mouth every 6 hours as needed for Heartburn    Leela De La Rosa MD   polycarbophil (FIBERCON) 625 MG tablet Take 1 tablet by mouth 2 times daily for loose stool    Leela De La Rosa MD   ferrous sulfate (IRON 325) 325 (65 Fe) MG tablet Take 1 tablet by mouth Every Monday, Wednesday, and Friday    Leela De La Rosa MD   ketotifen fumarate (ZADITOR) 0.035 % ophthalmic solution 1 drop 2 times daily    Leela De La Rosa MD   divalproex (DEPAKOTE ER) 500 MG extended release tablet Take 1 tablet by mouth 2 times daily 5/1/24   Leela De La Rosa MD   famotidine (PEPCID) 20 MG tablet Take 1 tablet by mouth 2 times daily as needed 8/25/21   Leela De La Rosa MD   fludrocortisone (FLORINEF) 0.1 MG tablet Take 1 tablet by mouth nightly 8/25/21   Leela De La Rosa MD   folic acid (FOLVITE) 1 MG tablet Take 1 tablet by mouth daily 8/30/23   Leela De La Rosa MD   furosemide (LASIX) 20 MG tablet Take 1 tablet by mouth daily as needed 2/12/24   Leela De La Rosa MD   loratadine (CLARITIN) 10 MG tablet Take 1 tablet by mouth daily 8/25/21   Leela De La Rosa MD   metoprolol tartrate (LOPRESSOR) 25 MG tablet Take 1 tablet by mouth 2 times daily 2/15/24   Leela De La Rosa MD   NIFEdipine (ADALAT CC) 30 MG extended release tablet Take 1 tablet by mouth daily 2/13/24   Leela De La Rosa MD   QUEtiapine (SEROQUEL) 100 MG tablet Take 1 tablet by mouth daily 6/8/22   Leela De La Rosa MD   QUEtiapine (SEROQUEL) 200 MG tablet Take 1

## 2024-10-10 NOTE — CARE COORDINATION
Case Management Assessment  Initial Evaluation    Date/Time of Evaluation: 10/10/2024 1:02 PM  Assessment Completed by: RONNA JONES RN    If patient is discharged prior to next notation, then this note serves as note for discharge by case management.    Patient Name: Jil Mcneill                   YOB: 1955  Diagnosis: Septicemia (HCC) [A41.9]  Elevated troponin [R79.89]  KIMBERLI (acute kidney injury) (HCC) [N17.9]  Acute respiratory failure with hypoxia [J96.01]  Urinary tract infection without hematuria, site unspecified [N39.0]  Pneumonia of right middle lobe due to infectious organism [J18.9]  Acute pneumonia [J18.9]                   Date / Time: 10/9/2024 12:30 PM    Patient Admission Status: Inpatient   Readmission Risk (Low < 19, Mod (19-27), High > 27): Readmission Risk Score: 21    Current PCP: Ceasar Araujo MD  PCP verified by CM? Yes (in house LTC md)    Chart Reviewed: Yes      History Provided by: Child/Family, Other (see comment) (ltc)  Patient Orientation: Alert and Oriented    Patient Cognition: Alert    Hospitalization in the last 30 days (Readmission):  No    If yes, Readmission Assessment in  Navigator will be completed.    Advance Directives:      Code Status: Limited   Patient's Primary Decision Maker is: Legal Next of Kin      Discharge Planning:    Patient lives with: Other (Comment) (LTC) Type of Home: Long-Term Care  Primary Care Giver: Other (Comment) (lt)  Patient Support Systems include: Family Members, /, Other (Comment) (lt)   Current Financial resources: Medicaid, Medicare  Current community resources: ECF/Home Care  Current services prior to admission: Long Term Acute Care            Current DME:              Type of Home Care services:  None    ADLS  Prior functional level: Assistance with the following:, Bathing, Dressing, Toileting, Cooking, Housework, Shopping, Mobility  Current functional level: Mobility, Shopping, Housework,  provided with a choice of provider and agrees with the discharge plan. Freedom of choice list with basic dialogue that supports the patient's individualized plan of care/goals and shares the quality data associated with the providers was provided to:     Patient Representative Name:       The Patient and/or Patient Representative Agree with the Discharge Plan?      RONNA JONES RN

## 2024-10-10 NOTE — PROGRESS NOTES
V2.0  [facility] Hospitalist Progress Note      Name:  Jil Mcneill /Age/Sex: 1955  (69 y.o. female)   MRN & CSN:  [id number] & [id number] Encounter Date/Time: 10/10/2024 6:35 PM EDT    Location:  [address] [place] [address] PCP: Ceasar Araujo MD       Hospital Day: 2    Assessment and Plan:   Jil Mcneill is a 69 y.o. female with pmh of  who presents with Acute pneumonia      Plan:    Pneumonia  - no evidence of sepsis  - suspect gram positive  - stated on zosyn--continue     UTI  - noted on UA  - urine culture pending  - started on zosyn--continue  Urine culture growing Klebsiella.  Wait for final sensitivity results.    COPD  - no evidence of exacerbation  - continue inhalers     Adrenal insufficiency  - continue cortef, florinef     CKD IV  - Cr at baseline  - continue to monitor     Hypothyroidism  - continue synthroid     Depression  - mood stable  - continue cymbalta, depakote, seroquel     Anemia  - chronic, stable       Diet ADULT DIET; Regular   DVT Prophylaxis [] Lovenox, []  Heparin, [] SCDs, [] Ambulation,  [] Eliquis, [] Xarelto  [] Coumadin   Code Status Limited   Disposition From:   Expected Disposition:   Estimated Date of Discharge:   Patient requires continued admission due to    Surrogate Decision Maker/ POA      Personally reviewed Lab Studies and Imaging         Subjective:     Chief Complaint: Shortness of Breath (Pt from [facility] via [facility]. Pt had a cough over the weekend A/O x4 . Today couldn't open eyes. Pt 84% en route EMS gave 125 Solumedrol. [facility] 0930)       Jil Mcneill is a 69 y.o. female who presents with increased fatigue.    Seen and examined in the morning.  Patient sleepy.  Denies any complaints.           Review of Systems:    Review of Systems    Negative if not mentioned above    Objective:     Intake/Output Summary (Last 24 hours) at 10/10/2024 1835  Last data filed at 10/10/2024 1457  Gross per 24 hour   Intake 1010 ml   Output 1925 ml   Net -915 ml        Vitals:  at 6:35 PM

## 2024-10-10 NOTE — PLAN OF CARE
Problem: Discharge Planning  Goal: Discharge to home or other facility with appropriate resources  Outcome: Progressing     Problem: Pain  Goal: Verbalizes/displays adequate comfort level or baseline comfort level  Outcome: Progressing     Problem: Skin/Tissue Integrity  Goal: Absence of new skin breakdown  Description: 1.  Monitor for areas of redness and/or skin breakdown  2.  Assess vascular access sites hourly  3.  Every 4-6 hours minimum:  Change oxygen saturation probe site  4.  Every 4-6 hours:  If on nasal continuous positive airway pressure, respiratory therapy assess nares and determine need for appliance change or resting period.  Outcome: Progressing     Problem: ABCDS Injury Assessment  Goal: Absence of physical injury  Outcome: Progressing     Problem: Safety - Adult  Goal: Free from fall injury  Outcome: Progressing     Problem: Respiratory - Adult  Goal: Achieves optimal ventilation and oxygenation  Outcome: Progressing

## 2024-10-10 NOTE — PROGRESS NOTES
4 Eyes Skin Assessment     The patient is being assess for   Admission    I agree that 2 RN's have performed a thorough Head to Toe Skin Assessment on the patient. ALL assessment sites listed below have been assessed.      Areas assessed by both nurses:   [x]   Head, Face, and Ears   [x]   Shoulders, Back, and Chest, Abdomen  [x]   Arms, Elbows, and Hands   [x]   Coccyx, Sacrum, and Ischium  [x]   Legs, Feet, and Heels        Redness bilaterally under breasts, bilateral buttocks and back thigh fold, groin    **SHARE this note so that the co-signing nurse is able to place an eSignature**    Co-signer eSignature: Electronically signed by Viri Edwards RN on 10/9/24 at 11:26 PM EDT    Does the Patient have Skin Breakdown?  Yes [facility] WOUND CARE was Initiated documentation include the Dayan-wound, Wound Assessment, Measurements, Dressing Treatment, Drainage, and Color\",          Jose Prevention initiated:  Yes   Wound Care Orders initiated:  Yes      WOC nurse consulted for Pressure Injury (Stage 3,4, Unstageable, DTI, NWPT, Complex wounds)and New or Established Ostomies:  Yes      Primary Nurse eSignature: Electronically signed by Yana Guadarrama RN on 10/9/24 at 11:23 PM EDT

## 2024-10-11 LAB
BACTERIA UR CULT: ABNORMAL
ORGANISM: ABNORMAL

## 2024-10-11 PROCEDURE — 6360000002 HC RX W HCPCS: Performed by: INTERNAL MEDICINE

## 2024-10-11 PROCEDURE — 1200000000 HC SEMI PRIVATE

## 2024-10-11 PROCEDURE — 6370000000 HC RX 637 (ALT 250 FOR IP)

## 2024-10-11 PROCEDURE — 6370000000 HC RX 637 (ALT 250 FOR IP): Performed by: INTERNAL MEDICINE

## 2024-10-11 PROCEDURE — 94640 AIRWAY INHALATION TREATMENT: CPT

## 2024-10-11 PROCEDURE — 2580000003 HC RX 258: Performed by: INTERNAL MEDICINE

## 2024-10-11 PROCEDURE — 94669 MECHANICAL CHEST WALL OSCILL: CPT

## 2024-10-11 PROCEDURE — 6370000000 HC RX 637 (ALT 250 FOR IP): Performed by: NURSE PRACTITIONER

## 2024-10-11 PROCEDURE — 94761 N-INVAS EAR/PLS OXIMETRY MLT: CPT

## 2024-10-11 PROCEDURE — 2700000000 HC OXYGEN THERAPY PER DAY

## 2024-10-11 RX ORDER — OXYCODONE AND ACETAMINOPHEN 7.5; 325 MG/1; MG/1
1 TABLET ORAL EVERY 4 HOURS PRN
Status: DISCONTINUED | OUTPATIENT
Start: 2024-10-11 | End: 2024-10-11

## 2024-10-11 RX ORDER — HYDROCODONE BITARTRATE AND ACETAMINOPHEN 7.5; 325 MG/1; MG/1
1 TABLET ORAL EVERY 4 HOURS PRN
Status: DISCONTINUED | OUTPATIENT
Start: 2024-10-11 | End: 2024-10-12 | Stop reason: HOSPADM

## 2024-10-11 RX ADMIN — GABAPENTIN 300 MG: 300 CAPSULE ORAL at 21:08

## 2024-10-11 RX ADMIN — ENOXAPARIN SODIUM 30 MG: 100 INJECTION SUBCUTANEOUS at 07:39

## 2024-10-11 RX ADMIN — PIPERACILLIN AND TAZOBACTAM 4500 MG: 4; .5 INJECTION, POWDER, LYOPHILIZED, FOR SOLUTION INTRAVENOUS at 05:23

## 2024-10-11 RX ADMIN — QUETIAPINE FUMARATE 200 MG: 100 TABLET ORAL at 21:08

## 2024-10-11 RX ADMIN — MICONAZOLE NITRATE: 2 POWDER TOPICAL at 21:10

## 2024-10-11 RX ADMIN — DIVALPROEX SODIUM 500 MG: 250 TABLET, EXTENDED RELEASE ORAL at 07:38

## 2024-10-11 RX ADMIN — AMOXICILLIN AND CLAVULANATE POTASSIUM 1 TABLET: 875; 125 TABLET, FILM COATED ORAL at 21:08

## 2024-10-11 RX ADMIN — METOPROLOL TARTRATE 25 MG: 25 TABLET, FILM COATED ORAL at 07:38

## 2024-10-11 RX ADMIN — FERROUS SULFATE TAB 325 MG (65 MG ELEMENTAL FE) 325 MG: 325 (65 FE) TAB at 17:29

## 2024-10-11 RX ADMIN — HYDROCORTISONE 10 MG: 10 TABLET ORAL at 07:39

## 2024-10-11 RX ADMIN — GABAPENTIN 300 MG: 300 CAPSULE ORAL at 07:38

## 2024-10-11 RX ADMIN — QUETIAPINE FUMARATE 100 MG: 100 TABLET ORAL at 07:38

## 2024-10-11 RX ADMIN — HYDROCODONE BITARTRATE AND ACETAMINOPHEN 1 TABLET: 7.5; 325 TABLET ORAL at 21:42

## 2024-10-11 RX ADMIN — Medication 2 PUFF: at 12:14

## 2024-10-11 RX ADMIN — LEVOTHYROXINE SODIUM 150 MCG: 0.15 TABLET ORAL at 04:43

## 2024-10-11 RX ADMIN — CLONAZEPAM 0.5 MG: 0.5 TABLET ORAL at 21:08

## 2024-10-11 RX ADMIN — NIFEDIPINE 30 MG: 30 TABLET, FILM COATED, EXTENDED RELEASE ORAL at 07:39

## 2024-10-11 RX ADMIN — GABAPENTIN 300 MG: 300 CAPSULE ORAL at 12:46

## 2024-10-11 RX ADMIN — CLONAZEPAM 0.5 MG: 0.5 TABLET ORAL at 07:38

## 2024-10-11 RX ADMIN — CLONAZEPAM 0.5 MG: 0.5 TABLET ORAL at 12:46

## 2024-10-11 RX ADMIN — DIVALPROEX SODIUM 500 MG: 250 TABLET, EXTENDED RELEASE ORAL at 21:08

## 2024-10-11 RX ADMIN — HYDROCODONE BITARTRATE AND ACETAMINOPHEN 1 TABLET: 7.5; 325 TABLET ORAL at 12:46

## 2024-10-11 RX ADMIN — SODIUM CHLORIDE, PRESERVATIVE FREE 10 ML: 5 INJECTION INTRAVENOUS at 21:09

## 2024-10-11 RX ADMIN — DULOXETINE HYDROCHLORIDE 90 MG: 30 CAPSULE, DELAYED RELEASE ORAL at 07:39

## 2024-10-11 RX ADMIN — ENOXAPARIN SODIUM 30 MG: 100 INJECTION SUBCUTANEOUS at 21:08

## 2024-10-11 RX ADMIN — FLUDROCORTISONE ACETATE 100 MCG: 0.1 TABLET ORAL at 21:42

## 2024-10-11 RX ADMIN — Medication 2 PUFF: at 19:19

## 2024-10-11 RX ADMIN — METOPROLOL TARTRATE 25 MG: 25 TABLET, FILM COATED ORAL at 21:09

## 2024-10-11 RX ADMIN — MICONAZOLE NITRATE: 2 POWDER TOPICAL at 07:40

## 2024-10-11 RX ADMIN — HYDROCODONE BITARTRATE AND ACETAMINOPHEN 1 TABLET: 7.5; 325 TABLET ORAL at 17:29

## 2024-10-11 RX ADMIN — MONTELUKAST 10 MG: 10 TABLET, FILM COATED ORAL at 21:08

## 2024-10-11 RX ADMIN — OXYCODONE HYDROCHLORIDE AND ACETAMINOPHEN 1 TABLET: 5; 325 TABLET ORAL at 04:43

## 2024-10-11 RX ADMIN — Medication 2 PUFF: at 16:05

## 2024-10-11 ASSESSMENT — PAIN DESCRIPTION - ORIENTATION
ORIENTATION: RIGHT;LEFT;MID

## 2024-10-11 ASSESSMENT — PAIN SCALES - WONG BAKER: WONGBAKER_NUMERICALRESPONSE: NO HURT

## 2024-10-11 ASSESSMENT — PAIN DESCRIPTION - LOCATION
LOCATION: BACK;HIP
LOCATION: GENERALIZED;BACK
LOCATION: BACK;HIP

## 2024-10-11 ASSESSMENT — PAIN - FUNCTIONAL ASSESSMENT: PAIN_FUNCTIONAL_ASSESSMENT: ACTIVITIES ARE NOT PREVENTED

## 2024-10-11 ASSESSMENT — PAIN SCALES - GENERAL
PAINLEVEL_OUTOF10: 6
PAINLEVEL_OUTOF10: 6
PAINLEVEL_OUTOF10: 8

## 2024-10-11 ASSESSMENT — PAIN DESCRIPTION - DESCRIPTORS
DESCRIPTORS: ACHING;DISCOMFORT
DESCRIPTORS: ACHING
DESCRIPTORS: ACHING

## 2024-10-11 NOTE — PROGRESS NOTES
Wound Care checked to find Ileostomy appliance intact with VAC drape securing it to patient.  Last changed 10/10/24 @ 0733 by Carmen AMATO.  Wound Care spoke with bedside nurse, Livier, about appliance.   If becomes loose Livier will call Wound Care.   Wound Care left in room Convatec  one piece 35 mm with convex barrier rings.

## 2024-10-11 NOTE — PLAN OF CARE
Problem: Discharge Planning  Goal: Discharge to home or other facility with appropriate resources  Outcome: Progressing     Problem: Pain  Goal: Verbalizes/displays adequate comfort level or baseline comfort level  Outcome: Progressing  Note: Pt scoring pain on 0-10 scale. Pain medications given per MAR. Pt instructed to call out when pain level increasing. Call light within reach.      Problem: Skin/Tissue Integrity  Goal: Absence of new skin breakdown  Description: 1.  Monitor for areas of redness and/or skin breakdown  2.  Assess vascular access sites hourly  3.  Every 4-6 hours minimum:  Change oxygen saturation probe site  4.  Every 4-6 hours:  If on nasal continuous positive airway pressure, respiratory therapy assess nares and determine need for appliance change or resting period.  Outcome: Progressing     Problem: ABCDS Injury Assessment  Goal: Absence of physical injury  Outcome: Progressing     Problem: Safety - Adult  Goal: Free from fall injury  Outcome: Progressing  Note: Bed in lowest position, wheels locked, 2/4 side rails up, nonskid footwear on.  Bed/chair alarm in place, call light within reach. Pt instructed to call out when needing assistance. Pt stated understanding.      Problem: Respiratory - Adult  Goal: Achieves optimal ventilation and oxygenation  Outcome: Progressing

## 2024-10-11 NOTE — PROGRESS NOTES
Ostomy Referral Follow-up Progress Note      NAME:  Jil Mcneill  MEDICAL RECORD NUMBER:  [id number]  AGE: 69 y.o.   GENDER:  female  :  1955  TODAY'S DATE:  10/11/2024    Subjective: I'm cold    Jil Mcneill is a 69 y.o. female referred by:   [] Physician  [x] Nursing  [] Other:     Changed 10/11/24 0940      Established   Ileostomy created 2009 by Dr. Wells with [facility]. Due to Crohn's disease.     Dayan skin abraded around stoma, red & mist.  Cleansed with warm wash cloth.  Pat dry.  Applied stoma powder and barrier film to form a crust.  Placed a barrier strip piece to medial side.  I piece conatec over stoma with Vac drap to out edges low side and extenders around rest of appliance.          Objective    BP (!) 161/88   Pulse 94   Temp 98.8 °F (37.1 °C)   Resp 22   Ht 1.753 m (5' 9\")   Wt 136.1 kg (300 lb)   SpO2 93%   BMI 44.30 kg/m²     Jose Risk Score Jose Scale Score: 15    Assessment       Ileostomy       Ileostomy/Jejunostomy LLQ Ileostomy (Active)   Stomal Appliance 1 piece;Convex;Changed 10/11/24 0733   Flange Size (inches) 2.25 Inches 10/11/24 0733   Stoma  Assessment Protrudes;Moist;Red 10/11/24 0733   Peristomal Assessment Denuded 10/11/24 0733   Mucocutaneous Junction Intact 10/11/24 0733   Treatment Pouch change 10/11/24 0733   Stool Appearance Loose 10/11/24 0733   Stool Color Brown;Yellow 10/11/24 0733   Stool Amount Smear 10/11/24 0733   Output (mL) 150 ml 10/09/24 1618   Number of days: 5740       Per last Gini, A.[facility] notes 2024  Together with patient, removed current leaking pouch. Skin cleansed with warm water.  Pat dry.  Crusted with stoma powder then barrier film spray.  Paste ring applied around stoma.  Paste strip applied to crease to naval.  Convetec Esteem one piece convex 35 mm appliance applied around stoma.  Barrier extenders applied around stoma pouch and toward naval.  Bag closed.       Stoma Care - Existing ileostomy - Patient to empty appliance  when 1/3 to 1/2 full with assistance of the staff. Cleanse inside and outside of the drain spout prior to rolling closed. Change appliance every 3-5 days or 1-2 times a week.  Call for problems with seal [phone] or [phone].                Intake/Output Summary (Last 24 hours) at 10/11/2024 0940  Last data filed at 10/10/2024 2024  Gross per 24 hour   Intake 360 ml   Output 1225 ml   Net -865 ml       Plan:   Plan for Ostomy Care:      Used Convate 1 piece urostomy appliance.  With barrier to medial side and extenders around appliance.        Current Diet: ADULT DIET; Regular  Dietician consult:  Yes    Discharge Plan:  Placement for patient upon discharge: skilled nursing        Referrals:  [x]   [] Home Health Care  [] Supplies  [] Other        Level of patient/caregiver understanding able to:Patient states she does not bother pouch.  [] Indicates understanding       [x] Needs reinforcement  [] Unsuccessful      [] Verbal Understanding  [] Demonstrated understanding       [] No evidence of learning  [] Refused teaching         [] N/A    Electronically signed by Dionne Verdin RN, on 10/11/2024 at 9:40 AM

## 2024-10-11 NOTE — PROGRESS NOTES
Ileostomy/Jejunostomy LLQ Ileostomy   Placement Date/Time: 01/23/09 0400   Present on Admission/Arrival: Yes  Location: LLQ  Ostomy Type: Ileostomy   Stomal Appliance 2 piece;Convex;Changed   Stoma  Assessment Protrudes;Moist;Red   Peristomal Assessment Denuded   Mucocutaneous Junction Intact   Treatment Pouch change;Site care;Stoma powder;Heat applied;Barrier ring   Stool Appearance Loose   Stool Color Brown   Stool Amount Small   Output (mL) 50 ml     Ileostomy created 01/23/2009 by Dr. Wells with [facility]. Due to Crohn's disease.   Measured stoma 25 mm   Wound Care requested back to room due to Ileostomy leaking.  Removed appliance.     Provided skin care with barrier powder & barrier film to form crust.  Placed barrier ring around stoma, barrier strip doubled over to medial and lateral sides as in crease of abdomen when patient moves.  Placed Convex RONEY wafer over stoma & apply barrier strip paste to inner edges around stoma also.    Attach RONEY pouch to wafer;   attach to suction or gravity at side of bed.  Apply barrier extenders to outer edges of wafer.  Apply heat to appliance for seal.    Roney pouch [id number]  Roney [id number]  Barrier ring [id number]   Strip barrier past [id number]    Keep connectors between pouch& drainage bag

## 2024-10-11 NOTE — CARE COORDINATION
Chart reviewed day 2. Pt is followed by IM. Wound care following for ileostomy/skin care. Receiving IV abx for PNA and UTI. Per pt & Nephew pt will return to [facility] until other arrangements can be made. Bed Hold Medicaid. Will follow for needs as they arise. Electronically signed by RONNA JONES RN on 10/11/2024 at 10:59 AM

## 2024-10-11 NOTE — PROGRESS NOTES
[facility] Medicine Progress Note  V10/1      Date of Admission: 10/9/2024  Hospital Day: 3    Chief Admission Complaint:  Cough     Subjective:  Patient seen and examined this morning. She states that she does not feel well. She states that she is in a lot of pain.     Presenting Admission History:       69 y.o. female who presented to [facility] with cough.  PMHx significant for COPD, CKD, adrenal insufficiency, hypothyroidism, anemia, depression. Patient is at a long term care facility. She started having increasing cough several days ago. Cough has been non-productive. She has also noticed increased fatigue. She is wheelchair dependent at baseline but has been having more difficulty with transfers lately. Patient was admitted to the hospital a few months ago due to pneumonia an UTI.     Assessment/Plan:      Current Principal Problem:  Acute pneumonia    Pneumonia  - no evidence of sepsis  - suspect gram positive  - D/c Zosyn and start Augmentin      UTI  - noted on UA  - urine culture growing E. Coli   - D/c Zosyn and start Augmentin     COPD  - no evidence of exacerbation  - continue inhalers     Adrenal insufficiency  - continue cortef, florinef     CKD IV  - Cr at baseline  - continue to monitor     Hypothyroidism  - continue synthroid     Depression  - mood stable  - continue cymbalta, depakote, seroquel     Anemia  - chronic, stable    Physical Exam Performed:      General appearance:  No acute distress, appears stated age and cooperative. Anxious   HEENT:  Normal cephalic, atraumatic without obvious deformity. Conjunctivae/corneas clear.  Neck: No jugular venous distention.    Respiratory:  Normal respiratory effort. Decreased breath sounds bilaterally   Cardiovascular:  Regular rate and rhythm without murmurs, rubs or gallops.  Abdomen: Soft, non-tender, non-distended with normal bowel sounds.  Musculoskeletal:  No clubbing, cyanosis or edema bilaterally.    Skin: Skin color, texture, turgor normal.     [] Critical electrolyte abnormalities requiring IV replacement and close serial monitoring  [] Insulin - monitoring FSBS for Hypoglycemic ADR  [] Anticoagulation requiring close serial monitoring    [] HD requiring close serial monitoring of electrolytes and fluid status  [] Other -  [] Change in code status:    [] Decision to escalate care:    [] Major surgery/procedure with associated risk factors:    ----------------------------------------------------------------------  C. Data (any 2)  [] Data Review (any 3)  [] All available Consultant notes from yesterday/today were reviewed  [x] All current labs were reviewed on 10/11/2024 and interpreted for clinical significance   [x] Appropriate follow-up labs were ordered  [] Collateral history obtained:    [] Independent Interpretation of tests (any 1)  [] Telemetry (Rhythm Strip) personally reviewed and interpreted as documented above.      [] Imaging personally reviewed and interpreted, includes:    [x] Discussion (any 1)  [x] Discussed the discharge plan in detail with case management in interdisciplinary rounds on 10/11/2024 including timing/barriers to discharge, need for support services and/or placement decision  [] Discussed management of the case with:      Over 50 minutes was spent talking and prepping for this patient     Medications:  Personally reviewed on 10/11/2024 in detail in conjunction w/ labs as documented for evidence of drug toxicity.     Infusion Medications    sodium chloride 25 mL/hr at 10/10/24 2200     Scheduled Medications    albuterol sulfate HFA  2 puff Inhalation Q4H WA RT    piperacillin-tazobactam  4,500 mg IntraVENous Q8H    miconazole   Topical BID    divalproex  500 mg Oral BID    DULoxetine  90 mg Oral Daily    ferrous sulfate  325 mg Oral Once per day on Monday Wednesday Friday    fludrocortisone  100 mcg Oral Nightly    gabapentin  300 mg Oral TID    hydrocortisone  10 mg Oral Daily    QUEtiapine  200 mg Oral Nightly

## 2024-10-12 VITALS
SYSTOLIC BLOOD PRESSURE: 137 MMHG | HEIGHT: 69 IN | DIASTOLIC BLOOD PRESSURE: 79 MMHG | OXYGEN SATURATION: 92 % | WEIGHT: 293 LBS | TEMPERATURE: 98.2 F | BODY MASS INDEX: 43.4 KG/M2 | HEART RATE: 108 BPM | RESPIRATION RATE: 17 BRPM

## 2024-10-12 LAB
ANION GAP SERPL CALCULATED.3IONS-SCNC: 17 MMOL/L (ref 3–16)
BUN SERPL-MCNC: 31 MG/DL (ref 7–20)
CALCIUM SERPL-MCNC: 8.4 MG/DL (ref 8.3–10.6)
CHLORIDE SERPL-SCNC: 97 MMOL/L (ref 99–110)
CO2 SERPL-SCNC: 22 MMOL/L (ref 21–32)
CREAT SERPL-MCNC: 1.7 MG/DL (ref 0.6–1.2)
DEPRECATED RDW RBC AUTO: 18.3 % (ref 12.4–15.4)
GFR SERPLBLD CREATININE-BSD FMLA CKD-EPI: 32 ML/MIN/{1.73_M2}
GLUCOSE SERPL-MCNC: 108 MG/DL (ref 70–99)
HCT VFR BLD AUTO: 30.8 % (ref 36–48)
HGB BLD-MCNC: 10.1 G/DL (ref 12–16)
MCH RBC QN AUTO: 31.4 PG (ref 26–34)
MCHC RBC AUTO-ENTMCNC: 32.7 G/DL (ref 31–36)
MCV RBC AUTO: 95.9 FL (ref 80–100)
PLATELET # BLD AUTO: 229 K/UL (ref 135–450)
PMV BLD AUTO: 7.1 FL (ref 5–10.5)
POTASSIUM SERPL-SCNC: 3.6 MMOL/L (ref 3.5–5.1)
RBC # BLD AUTO: 3.22 M/UL (ref 4–5.2)
SODIUM SERPL-SCNC: 136 MMOL/L (ref 136–145)
WBC # BLD AUTO: 8.9 K/UL (ref 4–11)

## 2024-10-12 PROCEDURE — 2580000003 HC RX 258: Performed by: INTERNAL MEDICINE

## 2024-10-12 PROCEDURE — 2700000000 HC OXYGEN THERAPY PER DAY

## 2024-10-12 PROCEDURE — 6370000000 HC RX 637 (ALT 250 FOR IP): Performed by: INTERNAL MEDICINE

## 2024-10-12 PROCEDURE — 80048 BASIC METABOLIC PNL TOTAL CA: CPT

## 2024-10-12 PROCEDURE — 6370000000 HC RX 637 (ALT 250 FOR IP)

## 2024-10-12 PROCEDURE — 6360000002 HC RX W HCPCS: Performed by: INTERNAL MEDICINE

## 2024-10-12 PROCEDURE — 36415 COLL VENOUS BLD VENIPUNCTURE: CPT

## 2024-10-12 PROCEDURE — 94640 AIRWAY INHALATION TREATMENT: CPT

## 2024-10-12 PROCEDURE — 85027 COMPLETE CBC AUTOMATED: CPT

## 2024-10-12 PROCEDURE — 94669 MECHANICAL CHEST WALL OSCILL: CPT

## 2024-10-12 PROCEDURE — 94761 N-INVAS EAR/PLS OXIMETRY MLT: CPT

## 2024-10-12 RX ADMIN — ENOXAPARIN SODIUM 30 MG: 100 INJECTION SUBCUTANEOUS at 08:41

## 2024-10-12 RX ADMIN — DIVALPROEX SODIUM 500 MG: 250 TABLET, EXTENDED RELEASE ORAL at 08:41

## 2024-10-12 RX ADMIN — HYDROCODONE BITARTRATE AND ACETAMINOPHEN 1 TABLET: 7.5; 325 TABLET ORAL at 06:39

## 2024-10-12 RX ADMIN — HYDROCODONE BITARTRATE AND ACETAMINOPHEN 1 TABLET: 7.5; 325 TABLET ORAL at 02:40

## 2024-10-12 RX ADMIN — METOPROLOL TARTRATE 25 MG: 25 TABLET, FILM COATED ORAL at 08:41

## 2024-10-12 RX ADMIN — CLONAZEPAM 0.5 MG: 0.5 TABLET ORAL at 13:49

## 2024-10-12 RX ADMIN — NIFEDIPINE 30 MG: 30 TABLET, FILM COATED, EXTENDED RELEASE ORAL at 08:42

## 2024-10-12 RX ADMIN — SODIUM CHLORIDE, PRESERVATIVE FREE 10 ML: 5 INJECTION INTRAVENOUS at 08:41

## 2024-10-12 RX ADMIN — MICONAZOLE NITRATE: 2 POWDER TOPICAL at 08:43

## 2024-10-12 RX ADMIN — CLONAZEPAM 0.5 MG: 0.5 TABLET ORAL at 05:18

## 2024-10-12 RX ADMIN — Medication 2 PUFF: at 11:10

## 2024-10-12 RX ADMIN — QUETIAPINE FUMARATE 100 MG: 100 TABLET ORAL at 08:42

## 2024-10-12 RX ADMIN — GABAPENTIN 300 MG: 300 CAPSULE ORAL at 08:41

## 2024-10-12 RX ADMIN — HYDROCORTISONE 10 MG: 10 TABLET ORAL at 08:42

## 2024-10-12 RX ADMIN — AMOXICILLIN AND CLAVULANATE POTASSIUM 1 TABLET: 875; 125 TABLET, FILM COATED ORAL at 08:42

## 2024-10-12 RX ADMIN — DULOXETINE HYDROCHLORIDE 90 MG: 30 CAPSULE, DELAYED RELEASE ORAL at 08:41

## 2024-10-12 RX ADMIN — LEVOTHYROXINE SODIUM 150 MCG: 0.15 TABLET ORAL at 05:18

## 2024-10-12 RX ADMIN — HYDROCODONE BITARTRATE AND ACETAMINOPHEN 1 TABLET: 7.5; 325 TABLET ORAL at 13:49

## 2024-10-12 RX ADMIN — GABAPENTIN 300 MG: 300 CAPSULE ORAL at 13:49

## 2024-10-12 RX ADMIN — Medication 2 PUFF: at 07:33

## 2024-10-12 ASSESSMENT — PAIN SCALES - GENERAL
PAINLEVEL_OUTOF10: 8
PAINLEVEL_OUTOF10: 7
PAINLEVEL_OUTOF10: 8
PAINLEVEL_OUTOF10: 7
PAINLEVEL_OUTOF10: 8
PAINLEVEL_OUTOF10: 5
PAINLEVEL_OUTOF10: 8
PAINLEVEL_OUTOF10: 8
PAINLEVEL_OUTOF10: 6

## 2024-10-12 ASSESSMENT — PAIN SCALES - WONG BAKER
WONGBAKER_NUMERICALRESPONSE: NO HURT

## 2024-10-12 ASSESSMENT — PAIN DESCRIPTION - ORIENTATION
ORIENTATION: LEFT;RIGHT
ORIENTATION: RIGHT;LEFT
ORIENTATION: RIGHT
ORIENTATION: LOWER

## 2024-10-12 ASSESSMENT — PAIN DESCRIPTION - LOCATION
LOCATION: LEG;HIP
LOCATION: BACK;HIP
LOCATION: BACK;HIP

## 2024-10-12 ASSESSMENT — PAIN DESCRIPTION - FREQUENCY
FREQUENCY: CONTINUOUS
FREQUENCY: CONTINUOUS

## 2024-10-12 ASSESSMENT — PAIN - FUNCTIONAL ASSESSMENT
PAIN_FUNCTIONAL_ASSESSMENT: ACTIVITIES ARE NOT PREVENTED
PAIN_FUNCTIONAL_ASSESSMENT: ACTIVITIES ARE NOT PREVENTED
PAIN_FUNCTIONAL_ASSESSMENT: PREVENTS OR INTERFERES SOME ACTIVE ACTIVITIES AND ADLS

## 2024-10-12 ASSESSMENT — PAIN DESCRIPTION - DESCRIPTORS
DESCRIPTORS: ACHING

## 2024-10-12 ASSESSMENT — PAIN DESCRIPTION - PAIN TYPE: TYPE: CHRONIC PAIN

## 2024-10-12 ASSESSMENT — PAIN DESCRIPTION - ONSET
ONSET: ON-GOING

## 2024-10-12 NOTE — DISCHARGE INSTR - COC
Continuity of Care Form    Patient Name: Jil Mcneill   :  1955  MRN:  [id number]    Admit date:  10/9/2024  Discharge date:  10/12/24    Code Status Order: Limited   Advance Directives:   Advance Care Flowsheet Documentation             Admitting Physician:  No admitting provider for patient encounter.  PCP: Ceasar Araujo MD    Discharging Nurse: Lea Estes RN  Discharging Hospital Unit/Room#: 0531/0531-01  Discharging Unit Phone Number: [phone]    Emergency Contact:   Extended Emergency Contact Information  Primary Emergency Contact: angela small  Mobile Phone: [phone]  Relation: Niece/Nephew    Past Surgical History:  Past Surgical History:   Procedure Laterality Date    HIP FRACTURE SURGERY      ILEOSTOMY OR JEJUNOSTOMY         Immunization History:   Immunization History   Administered Date(s) Administered    COVID-19, MODERNA, (age 12y+), IM, 50mcg/0.5mL 2023    COVID-19, PFIZER PURPLE top, DILUTE for use, (age 12 y+), 30mcg/0.3mL 2021, 2021, 11/10/2021    COVID-19, PFIZER, (age 12y+), IM, 30mcg/0.3mL 2024       Active Problems:  Patient Active Problem List   Diagnosis Code    Depression F32.A    COPD (chronic obstructive pulmonary disease) ([facility]) J44.9    Sepsis ([facility]) A41.9    Hypoxia R09.02    Acute renal failure superimposed on stage 3 chronic kidney disease ([facility]) N17.9, N18.30    Orthostatic hypotension I95.1    Anemia D64.9    Morbid obesity E66.01    Acute pneumonia J18.9       Isolation/Infection:   Isolation            Contact          Patient Infection Status       Infection Onset Added Last Indicated Last Indicated By Review Planned Expiration Resolved Resolved By    MDRO (multi-drug resistant organism) 24 Culture, Urine                           Nurse Assessment:  Last Vital Signs: /79   Pulse (!) 103   Temp 98.2 °F (36.8 °C) (Oral)   Resp 18   Ht 1.753 m (5' 9\")   Wt 136.1 kg (300 lb)   SpO2 99%   BMI 44.30 kg/m²    SECTION    Prognosis: Good    Condition at Discharge: Stable    Rehab Potential (if transferring to Rehab): Good    Recommended Labs or Other Treatments After Discharge: Recommend BMP in 1 week     Physician Certification: I certify the above information and transfer of Jil Mcneill  is necessary for the continuing treatment of the diagnosis listed and that she requires Skilled Nursing Facility for greater 30 days.     Update Admission H&P: No change in H&P    PHYSICIAN SIGNATURE:  Electronically signed by Scarlett Strange DO on 10/12/24 at 1:12 PM EDT

## 2024-10-12 NOTE — PROGRESS NOTES
[facility] Medicine Progress Note  V10/1      Date of Admission: 10/9/2024  Hospital Day: 4    Chief Admission Complaint:  Cough     Subjective:  Patient seen and examined this morning. She states that she does not feel well. She states that she is in a lot of pain.     Presenting Admission History:       69 y.o. female who presented to [facility] with cough.  PMHx significant for COPD, CKD, adrenal insufficiency, hypothyroidism, anemia, depression. Patient is at a long term care facility. She started having increasing cough several days ago. Cough has been non-productive. She has also noticed increased fatigue. She is wheelchair dependent at baseline but has been having more difficulty with transfers lately. Patient was admitted to the hospital a few months ago due to pneumonia an UTI.     Assessment/Plan:      Current Principal Problem:  Acute pneumonia    Pneumonia  - no evidence of sepsis  - suspect gram positive  - D/c Zosyn and start Augmentin      UTI  - noted on UA  - urine culture growing E. Coli   - D/c Zosyn and start Augmentin     COPD  - no evidence of exacerbation  - continue inhalers     Adrenal insufficiency  - continue cortef, florinef     CKD IV  - Cr at baseline  - continue to monitor     Hypothyroidism  - continue synthroid     Depression  - mood stable  - continue cymbalta, depakote, seroquel     Anemia  - chronic, stable    Physical Exam Performed:      General appearance:  No acute distress, appears stated age and cooperative. Anxious   HEENT:  Normal cephalic, atraumatic without obvious deformity. Conjunctivae/corneas clear.  Neck: No jugular venous distention.    Respiratory:  Normal respiratory effort. Decreased breath sounds bilaterally   Cardiovascular:  Regular rate and rhythm without murmurs, rubs or gallops.  Abdomen: Soft, non-tender, non-distended with normal bowel sounds.  Musculoskeletal:  No clubbing, cyanosis or edema bilaterally.    Skin: Skin color, texture, turgor normal.

## 2024-10-12 NOTE — CARE COORDINATION
CASE MANAGEMENT DISCHARGE SUMMARY      Discharge to: return to [facility]    Precertification completed: N/A  Hospital Exemption Notification (HENS) completed: N/A    Transportation:    Family/car: no   Medical Transport explained to pt/family. Pt/family voice no agency preference.    Agency used: [facility]   time: 1500   Ambulance form completed: Yes    Confirmed discharge plan with:     Patient: yes per RN     Family:  yes nephew Jorge     [phone]        Facility/Agency, name:  [facility] faxed   Phone number for report to facility: [phone]     RN, name: Lea AMATO    Note: Discharging nurse to complete [facility], reconcile [facility]S, and place final copy with patient's discharge packet. RN to ensure that written prescriptions for  Level II medications are sent with patient to the facility as per protocol.

## 2024-10-12 NOTE — DISCHARGE SUMMARY
Hospital Medicine Discharge Summary    Patient: Jil Mcneill   : 1955     Admit Date: 10/9/2024   Discharge Date:     Disposition:  []Home   []HHC  [x]SNF  []Acute Rehab  []LTAC  []Hospice  Code status:  []Full  []DNR/CCA  [x]Limited (DNR/CCA with Do Not Intubate)  []DNRCC  Condition at Discharge: Stable  Primary Care Provider: Ceasar Araujo MD    Admitting Provider: No admitting provider for patient encounter.  Discharge Provider: Scarlett Stragne DO     Discharge Diagnoses:      Active Hospital Problems    Diagnosis     Acute pneumonia [J18.9]        Presenting Admission History:      69 y.o. female who presented to [facility] with cough. PMHx significant for COPD, CKD, adrenal insufficiency, hypothyroidism, anemia, depression. Patient is at a long term care facility. She started having increasing cough several days ago. Cough has been non-productive. She has also noticed increased fatigue. She is wheelchair dependent at baseline but has been having more difficulty with transfers lately. Patient was admitted to the hospital a few months ago due to pneumonia an UTI.      Assessment/Plan:      Pneumonia  - no evidence of sepsis  - suspect gram positive  - D/c with Augmentin      UTI  - noted on UA  - urine culture growing E. Coli   - D/c with Augmentin     COPD  - no evidence of exacerbation  - continue inhalers     Adrenal insufficiency  - continue cortef, florinef     CKD IV  - Cr at baseline  - continue to monitor     Hypothyroidism  - continue synthroid     Depression  - mood stable  - continue cymbalta, depakote, seroquel     Anemia  - chronic, stable    Physical Exam Performed:      /79   Pulse (!) 103   Temp 98.2 °F (36.8 °C) (Oral)   Resp 18   Ht 1.753 m (5' 9\")   Wt 136.1 kg (300 lb)   SpO2 99%   BMI 44.30 kg/m²     General appearance:  No apparent distress, appears stated age and cooperative.  Respiratory:  Normal respiratory effort.   Cardiovascular:  Regular rate and  10/12/24  0855   WBC 7.0 8.9   HGB 9.0* 10.1*   HCT 27.9* 30.8*    229     Recent Labs     10/10/24  0535 10/12/24  0855    136   K 4.7 3.6    97*   CO2 26 22   BUN 52* 31*   CREATININE 2.2* 1.7*   CALCIUM 8.9 8.4     Recent Labs     10/09/24  1356   TROPHS 52*     No results for input(s): \"LABA1C\" in the last 72 hours.  No results for input(s): \"AST\", \"ALT\", \"BILIDIR\", \"BILITOT\", \"ALKPHOS\" in the last 72 hours.  No results for input(s): \"INR\", \"LACTA\", \"TSH\" in the last 72 hours.    Urine Cultures:   Lab Results   Component Value Date/Time    LABURIN 75,000 CFU/ml 10/09/2024 01:54 PM     Blood Cultures:   Lab Results   Component Value Date/Time    BC No Growth after 4 days of incubation. 06/11/2024 01:13 AM     Lab Results   Component Value Date/Time    BLOODCULT2 No Growth after 4 days of incubation. 06/11/2024 01:13 AM     Organism:   Lab Results   Component Value Date/Time    ORG Klebsiella pneumoniae 10/09/2024 01:54 PM       Signed:    Scarlett Strange DO